# Patient Record
Sex: FEMALE | Race: WHITE | NOT HISPANIC OR LATINO | Employment: FULL TIME | URBAN - METROPOLITAN AREA
[De-identification: names, ages, dates, MRNs, and addresses within clinical notes are randomized per-mention and may not be internally consistent; named-entity substitution may affect disease eponyms.]

---

## 2017-01-05 ENCOUNTER — EMPLOYEE HEALTH (OUTPATIENT)
Dept: OCCUPATIONAL MEDICINE | Facility: CLINIC | Age: 22
End: 2017-01-05

## 2017-01-05 VITALS
OXYGEN SATURATION: 97 % | WEIGHT: 143 LBS | HEIGHT: 69 IN | HEART RATE: 87 BPM | DIASTOLIC BLOOD PRESSURE: 62 MMHG | BODY MASS INDEX: 21.18 KG/M2 | SYSTOLIC BLOOD PRESSURE: 116 MMHG | TEMPERATURE: 97.9 F | RESPIRATION RATE: 16 BRPM

## 2017-01-05 DIAGNOSIS — Z02.1 PRE-EMPLOYMENT DRUG SCREENING: ICD-10-CM

## 2017-01-05 DIAGNOSIS — Z02.1 PHYSICAL EXAM, PRE-EMPLOYMENT: ICD-10-CM

## 2017-01-05 LAB
AMP AMPHETAMINE: NORMAL
BAR BARBITURATES: NORMAL
BZO BENZODIAZEPINES: NORMAL
COC COCAINE: NORMAL
INT CON NEG: NORMAL
INT CON POS: NORMAL
MDMA ECSTASY: NORMAL
MET METHAMPHETAMINES: NORMAL
MTD METHADONE: NORMAL
OPI OPIATES: NORMAL
OXY OXYCODONE: NORMAL
PCP PHENCYCLIDINE: NORMAL
POC URINE DRUG SCREEN OCDRS: NEGATIVE
THC: NORMAL

## 2017-01-05 PROCEDURE — 80305 DRUG TEST PRSMV DIR OPT OBS: CPT | Performed by: PREVENTIVE MEDICINE

## 2017-01-05 PROCEDURE — 94375 RESPIRATORY FLOW VOLUME LOOP: CPT | Performed by: PREVENTIVE MEDICINE

## 2017-01-05 PROCEDURE — 94010 BREATHING CAPACITY TEST: CPT | Performed by: PREVENTIVE MEDICINE

## 2017-01-05 PROCEDURE — 99204 OFFICE O/P NEW MOD 45 MIN: CPT | Performed by: PREVENTIVE MEDICINE

## 2017-01-05 ASSESSMENT — VISUAL ACUITY
OD_CC: 20/15
OS_CC: 20/20

## 2017-01-05 NOTE — MR AVS SNAPSHOT
Anne John   2017 2:50 PM   Employee Health   MRN: [id number]    Department:  [facility]   Dept Phone:  [phone]    Description:  Female : 1995   Provider:  Enio Moreno D.O.           Allergies as of 2017     Allergen Noted Reactions    Azithromycin 2012   Swelling    Uterine contractions; rare reaction to azith and allergy shots, per Dr. Tang, allergist  [id number]    Other Environmental 2012         You were diagnosed with     Physical exam, pre-employment   [[id number]]         Vital Signs     Smoking Status                   Never Smoker            Basic Information     Date Of Birth Sex Race Ethnicity Preferred Language    1995 Female White Non- English      Problem List              ICD-10-CM Priority Class Noted - Resolved    Dermoid cyst of right ovary D27.0   2015 - Present    Preventative health care Z00.00   2015 - Present    Dysuria R30.0   2015 - Present    Bilateral ovarian cysts N83.201, N83.202   2015 - Present    Uses birth control Z30.9   2016 - Present    Mild intermittent asthma without complication J45.20   2016 - Present    Acne vulgaris L70.0   2016 - Present      Health Maintenance        Date Due Completion Dates    IMM HPV VACCINE (1 of 3 - Female 3 Dose Series) 2007 ---    IMM MENINGOCOCCAL VACCINE (MCV4) (2 of 2) 2007    IMM PNEUMOCOCCAL 19-64 (ADULT) MEDIUM RISK SERIES (1 of 1 - PPSV23) 2015 ---    IMM DTaP/Tdap/Td Vaccine (7 - Td) 2017, 2001, 1997, 1996, 1996, 1996            Current Immunizations     Dtap Vaccine 2001, 1997, 1996, 1996, 1996    Hepatitis A Vaccine, Ped/Adol 2007, 2004    Hepatitis B Vaccine Non-Recombivax (Ped/Adol) 1997, 1996, 1996    Hib Vaccine (Prp-d) Historical Data 1997, 1996, 1996, 1996    Influenza Vaccine Quad  Inj (Preserved) 11/3/2016, 11/25/2015    MMR Vaccine 5/29/2001, 6/3/1997    Meningococcal Conjugate Vaccine MCV4 (Menactra) 11/30/2007    OPV - Historical Data 5/29/2001, 1996, 1996, 1996    QF GOLD 11/25/2015    QUANTIFERON GOLD 11/25/2015    Tdap Vaccine 11/30/2007    Tuberculin Skin Test 11/10/2016 10:53 AM, 11/3/2016  1:03 PM, 9/20/2013  2:11 PM, 9/13/2013  2:15 PM    Varicella Vaccine Live 11/30/2007, 12/17/2004      Below and/or attached are the medications your provider expects you to take. Review all of your home medications and newly ordered medications with your provider and/or pharmacist. Follow medication instructions as directed by your provider and/or pharmacist. Please keep your medication list with you and share with your provider. Update the information when medications are discontinued, doses are changed, or new medications (including over-the-counter products) are added; and carry medication information at all times in the event of emergency situations     Allergies:  AZITHROMYCIN - Swelling     OTHER ENVIRONMENTAL - (reactions not documented)               Medications  Valid as of: January 05, 2017 -  4:46 PM    Generic Name Brand Name Tablet Size Instructions for use    Albuterol Sulfate (Aero Soln) albuterol 108 (90 BASE) MCG/ACT Inhale 2 Puffs by mouth every 6 hours as needed for Shortness of Breath.        Levonorgestrel-Ethinyl Estrad (Tab) NORDETTE 0.15-30 MG-MCG Take 1 Tab by mouth every day.        Montelukast Sodium (Tab) SINGULAIR 10 MG Take 1 Tab by mouth every day.        .                 Medicines prescribed today were sent to:     \[facility]\"" [facility] #[id number] - [place], [place] - [address] [facility]    [address] [place] [address]    Phone: [phone] Fax: [phone]    Open 24 Hours?: No      Medication refill instructions:       If your prescription bottle indicates you have medication refills left, it is not necessary to call your provider’s office.  Please contact your pharmacy and they will refill your medication.    If your prescription bottle indicates you do not have any refills left, you may request refills at any time through one of the following ways: The online [employer] system (except Urgent Care), by calling your provider’s office, or by asking your pharmacy to contact your provider’s office with a refill request. Medication refills are processed only during regular business hours and may not be available until the next business day. Your provider may request additional information or to have a follow-up visit with you prior to refilling your medication.   *Please Note: Medication refills are assigned a new Rx number when refilled electronically. Your pharmacy may indicate that no refills were authorized even though a new prescription for the same medication is available at the pharmacy. Please request the medicine by name with the pharmacy before contacting your provider for a refill.           C[employer] Access Code: Activation code not generated  Current [employer] Status: Active

## 2017-01-11 ENCOUNTER — NON-PROVIDER VISIT (OUTPATIENT)
Dept: MEDICAL GROUP | Facility: MEDICAL CENTER | Age: 22
End: 2017-01-11
Payer: COMMERCIAL

## 2017-01-11 DIAGNOSIS — Z23 NEED FOR HPV VACCINATION: ICD-10-CM

## 2017-01-11 DIAGNOSIS — Z23 NEED FOR TDAP VACCINATION: ICD-10-CM

## 2017-01-11 PROCEDURE — 90472 IMMUNIZATION ADMIN EACH ADD: CPT | Performed by: NURSE PRACTITIONER

## 2017-01-11 PROCEDURE — 90471 IMMUNIZATION ADMIN: CPT | Performed by: NURSE PRACTITIONER

## 2017-01-11 PROCEDURE — 90715 TDAP VACCINE 7 YRS/> IM: CPT | Performed by: NURSE PRACTITIONER

## 2017-01-11 PROCEDURE — 90651 9VHPV VACCINE 2/3 DOSE IM: CPT | Performed by: NURSE PRACTITIONER

## 2017-01-11 NOTE — PROGRESS NOTES
".Anne John is a 21 y.o. female here for a non-provider visit for:   TDAP    Reason for immunization: continue or complete series started at the office  Immunization records indicate need for vaccine: Yes  Minimum interval has been met for this vaccine: Yes  ABN completed: Not Indicated    VIS Dated  02/24/2015 was given to patient Yes  All IAC Questionnaire questions were answered “No.\"    Patient tolerated injection and no adverse effects were observed or reported YES.    Pt scheduled for next dose in series: No    .Anne John is a 21 y.o. female here for a non-provider visit for:   HPV 1 of 3    Reason for immunization: continue or complete series started at the office  Immunization records indicate need for vaccine: Yes  Minimum interval has been met for this vaccine: Yes  ABN completed: Not Indicated    VIS Dated  03/31/16 was given to patient Yes  All IAC Questionnaire questions were answered “No.\"    Patient tolerated injection and no adverse effects were observed or reported YES    Pt scheduled for next dose in series: Yes              "

## 2017-01-11 NOTE — MR AVS SNAPSHOT
Anne John   2017 1:45 PM   Non-Provider Visit   MRN: [id number]    Department:  [facility]   Dept Phone:  [phone]    Description:  Female : 1995   Provider:  MAHESH MESSINA MA           Allergies as of 2017     Allergen Noted Reactions    Azithromycin 2012   Swelling    Uterine contractions; rare reaction to azith and allergy shots, per Dr. Tang, allergist  [id number]    Other Environmental 2012         You were diagnosed with     Need for Tdap vaccination   [[id number]]       Need for HPV vaccination   [[id number]]         Vital Signs     Smoking Status                   Never Smoker            Basic Information     Date Of Birth Sex Race Ethnicity Preferred Language    1995 Female White Non- English      Problem List              ICD-10-CM Priority Class Noted - Resolved    Dermoid cyst of right ovary D27.0   2015 - Present    Preventative health care Z00.00   2015 - Present    Dysuria R30.0   2015 - Present    Bilateral ovarian cysts N83.201, N83.202   2015 - Present    Uses birth control Z30.9   2016 - Present    Mild intermittent asthma without complication J45.20   2016 - Present    Acne vulgaris L70.0   2016 - Present      Health Maintenance        Date Due Completion Dates    IMM HPV VACCINE (1 of 3 - Female 3 Dose Series) 2007 ---    IMM MENINGOCOCCAL VACCINE (MCV4) (2 of 2) 2007    IMM PNEUMOCOCCAL 19-64 (ADULT) MEDIUM RISK SERIES (1 of 1 - PPSV23) 2015 ---    IMM DTaP/Tdap/Td Vaccine (7 - Td) 2017, 2001, 1997, 1996, 1996, 1996            Current Immunizations     Dtap Vaccine 2001, 1997, 1996, 1996, 1996    HPV 9-VALENT VACCINE (GARDASIL 9)  Incomplete    Hepatitis A Vaccine, Ped/Adol 2007, 2004    Hepatitis B Vaccine Non-Recombivax (Ped/Adol) 1997, 1996, 1996    Hib Vaccine  (Prp-d) Historical Data 11/14/1997, 1996, 1996, 1996    Influenza Vaccine Quad Inj (Preserved) 11/3/2016, 11/25/2015    MMR Vaccine 5/29/2001, 6/3/1997    Meningococcal Conjugate Vaccine MCV4 (Menactra) 11/30/2007    OPV - Historical Data 5/29/2001, 1996, 1996, 1996    QF GOLD 11/25/2015    QUANTIFERON GOLD 11/25/2015    Tdap Vaccine  Incomplete, 11/30/2007    Tuberculin Skin Test 11/10/2016 10:53 AM, 11/3/2016  1:03 PM, 9/20/2013  2:11 PM, 9/13/2013  2:15 PM    Varicella Vaccine Live 11/30/2007, 12/17/2004      Below and/or attached are the medications your provider expects you to take. Review all of your home medications and newly ordered medications with your provider and/or pharmacist. Follow medication instructions as directed by your provider and/or pharmacist. Please keep your medication list with you and share with your provider. Update the information when medications are discontinued, doses are changed, or new medications (including over-the-counter products) are added; and carry medication information at all times in the event of emergency situations     Allergies:  AZITHROMYCIN - Swelling     OTHER ENVIRONMENTAL - (reactions not documented)               Medications  Valid as of: January 11, 2017 -  1:56 PM    Generic Name Brand Name Tablet Size Instructions for use    Albuterol Sulfate (Aero Soln) albuterol 108 (90 BASE) MCG/ACT Inhale 2 Puffs by mouth every 6 hours as needed for Shortness of Breath.        Levonorgestrel-Ethinyl Estrad (Tab) NORDETTE 0.15-30 MG-MCG Take 1 Tab by mouth every day.        Montelukast Sodium (Tab) SINGULAIR 10 MG Take 1 Tab by mouth every day.        .                 Medicines prescribed today were sent to:     [facility] #[id number] - [place], [place] - [address]    [address] [facility] [place] [address]    Phone: [phone] Fax: [phone]    Open 24 Hours?: No      Medication refill instructions:       If your prescription  bottle indicates you have medication refills left, it is not necessary to call your provider’s office. Please contact your pharmacy and they will refill your medication.    If your prescription bottle indicates you do not have any refills left, you may request refills at any time through one of the following ways: The online [employer] system (except Urgent Care), by calling your provider’s office, or by asking your pharmacy to contact your provider’s office with a refill request. Medication refills are processed only during regular business hours and may not be available until the next business day. Your provider may request additional information or to have a follow-up visit with you prior to refilling your medication.   *Please Note: Medication refills are assigned a new Rx number when refilled electronically. Your pharmacy may indicate that no refills were authorized even though a new prescription for the same medication is available at the pharmacy. Please request the medicine by name with the pharmacy before contacting your provider for a refill.           [employer] Access Code: Activation code not generated  Current [employer] Status: Active

## 2017-03-15 ENCOUNTER — APPOINTMENT (OUTPATIENT)
Dept: MEDICAL GROUP | Facility: MEDICAL CENTER | Age: 22
End: 2017-03-15
Payer: COMMERCIAL

## 2017-03-16 ENCOUNTER — NON-PROVIDER VISIT (OUTPATIENT)
Dept: MEDICAL GROUP | Facility: MEDICAL CENTER | Age: 22
End: 2017-03-16
Payer: COMMERCIAL

## 2017-03-16 DIAGNOSIS — Z23 NEED FOR HPV VACCINATION: ICD-10-CM

## 2017-03-16 PROCEDURE — 90471 IMMUNIZATION ADMIN: CPT | Performed by: NURSE PRACTITIONER

## 2017-03-16 PROCEDURE — 99999 PR NO CHARGE: CPT | Performed by: NURSE PRACTITIONER

## 2017-03-16 PROCEDURE — 90651 9VHPV VACCINE 2/3 DOSE IM: CPT | Performed by: NURSE PRACTITIONER

## 2017-03-16 NOTE — PROGRESS NOTES
"Anne John is a 21 y.o. female here for a non-provider visit for:   HPV 2 of 3    Reason for immunization: continue or complete series started at the office  Immunization records indicate need for vaccine: Yes  Minimum interval has been met for this vaccine: Yes  ABN completed: Yes    VIS Dated  4/15/15 was given to patient Yes  All IAC Questionnaire questions were answered “No.\"    Patient tolerated injection and no adverse effects were observed or reported yes.    Pt scheduled for next dose in series: No        "

## 2017-03-16 NOTE — MR AVS SNAPSHOT
Anne John   3/16/2017 2:00 PM   Non-Provider Visit   MRN: [id number]    Department:  [facility]   Dept Phone:  [phone]    Description:  Female : 1995   Provider:  MAHESH FIERRO MA           Allergies as of 3/16/2017     Allergen Noted Reactions    Azithromycin 2012   Swelling    Uterine contractions; rare reaction to azith and allergy shots, per Dr. Tang, allergist  [id number]    Other Environmental 2012         You were diagnosed with     Need for HPV vaccination   [[id number]]         Vital Signs     Smoking Status                   Never Smoker            Basic Information     Date Of Birth Sex Race Ethnicity Preferred Language    1995 Female White Non- English      Your appointments     Mar 16, 2017  2:00 PM   Non Provider 1 with MAHESH FIERRO MA   [facility])    [address]  [place] [address] [phone]           You will be receiving a confirmation call a few days before your appointment from our automated call confirmation system.            Sep 18, 2017  2:30 PM   Non Provider 1 with MAHESH FIERRO MA   [facility])    [address]  [place] [id number] [phone]           You will be receiving a confirmation call a few days before your appointment from our automated call confirmation system.              Problem List              ICD-10-CM Priority Class Noted - Resolved    Dermoid cyst of right ovary D27.0   2015 - Present    Preventative health care Z00.00   2015 - Present    Dysuria R30.0   2015 - Present    Bilateral ovarian cysts N83.201, N83.202   2015 - Present    Uses birth control Z30.9   2016 - Present    Mild intermittent asthma without complication J45.20   2016 - Present    Acne vulgaris L70.0   2016 - Present      Health Maintenance        Date Due Completion Dates    IMM  MENINGOCOCCAL VACCINE (MCV4) (2 of 2) 5/11/2012 11/30/2007    IMM PNEUMOCOCCAL 19-64 (ADULT) MEDIUM RISK SERIES (1 of 1 - PPSV23) 5/11/2015 ---    IMM HPV VACCINE (2 of 3 - Female 3 Dose Series) 3/8/2017 1/11/2017    IMM DTaP/Tdap/Td Vaccine (8 - Td) 1/11/2027 1/11/2017, 11/30/2007, 5/29/2001, 11/14/1997, 1996, 1996, 1996            Current Immunizations     Dtap Vaccine 5/29/2001, 11/14/1997, 1996, 1996, 1996    HPV 9-VALENT VACCINE (GARDASIL 9)  Incomplete, 1/11/2017    Hepatitis A Vaccine, Ped/Adol 11/30/2007, 12/17/2004    Hepatitis B Vaccine Non-Recombivax (Ped/Adol) 2/20/1997, 1996, 1996    Hib Vaccine (Prp-d) Historical Data 11/14/1997, 1996, 1996, 1996    Influenza Vaccine Quad Inj (Preserved) 11/3/2016, 11/25/2015    MMR Vaccine 5/29/2001, 6/3/1997    Meningococcal Conjugate Vaccine MCV4 (Menactra) 11/30/2007    OPV - Historical Data 5/29/2001, 1996, 1996, 1996    QF GOLD 11/25/2015    QUANTIFERON GOLD 11/25/2015    Tdap Vaccine 1/11/2017, 11/30/2007    Tuberculin Skin Test 11/10/2016 10:53 AM, 11/3/2016  1:03 PM, 9/20/2013  2:11 PM, 9/13/2013  2:15 PM    Varicella Vaccine Live 11/30/2007, 12/17/2004      Below and/or attached are the medications your provider expects you to take. Review all of your home medications and newly ordered medications with your provider and/or pharmacist. Follow medication instructions as directed by your provider and/or pharmacist. Please keep your medication list with you and share with your provider. Update the information when medications are discontinued, doses are changed, or new medications (including over-the-counter products) are added; and carry medication information at all times in the event of emergency situations     Allergies:  AZITHROMYCIN - Swelling     OTHER ENVIRONMENTAL - (reactions not documented)               Medications  Valid as of: March 16, 2017 -  1:58 PM    Generic Name Brand  Name Tablet Size Instructions for use    Albuterol Sulfate (Aero Soln) albuterol 108 (90 BASE) MCG/ACT Inhale 2 Puffs by mouth every 6 hours as needed for Shortness of Breath.        Levonorgestrel-Ethinyl Estrad (Tab) NORDETTE 0.15-30 MG-MCG Take 1 Tab by mouth every day.        Montelukast Sodium (Tab) SINGULAIR 10 MG Take 1 Tab by mouth every day.        .                 Medicines prescribed today were sent to:     [facility] #[id number] - [place], [place] - [address]    [address] [place] [address]    Phone: [phone] Fax: [phone]    Open 24 Hours?: No      Medication refill instructions:       If your prescription bottle indicates you have medication refills left, it is not necessary to call your provider’s office. Please contact your pharmacy and they will refill your medication.    If your prescription bottle indicates you do not have any refills left, you may request refills at any time through one of the following ways: The online [employer] system (except Urgent Care), by calling your provider’s office, or by asking your pharmacy to contact your provider’s office with a refill request. Medication refills are processed only during regular business hours and may not be available until the next business day. Your provider may request additional information or to have a follow-up visit with you prior to refilling your medication.   *Please Note: Medication refills are assigned a new Rx number when refilled electronically. Your pharmacy may indicate that no refills were authorized even though a new prescription for the same medication is available at the pharmacy. Please request the medicine by name with the pharmacy before contacting your provider for a refill.           [employer] Access Code: Activation code not generated  Current [employer] Status: Active

## 2017-08-11 ENCOUNTER — HOSPITAL ENCOUNTER (OUTPATIENT)
Dept: LAB | Facility: MEDICAL CENTER | Age: 22
End: 2017-08-11
Attending: PHYSICIAN ASSISTANT
Payer: COMMERCIAL

## 2017-08-11 LAB — CYTOLOGY REG CYTOL: NORMAL

## 2017-08-11 PROCEDURE — 87491 CHLMYD TRACH DNA AMP PROBE: CPT

## 2017-08-11 PROCEDURE — 88175 CYTOPATH C/V AUTO FLUID REDO: CPT

## 2017-08-11 PROCEDURE — 87591 N.GONORRHOEAE DNA AMP PROB: CPT

## 2017-08-12 LAB
C TRACH DNA SPEC QL NAA+PROBE: NEGATIVE
N GONORRHOEA DNA SPEC QL NAA+PROBE: NEGATIVE
SPECIMEN SOURCE: NORMAL

## 2017-08-29 ENCOUNTER — APPOINTMENT (OUTPATIENT)
Dept: RADIOLOGY | Facility: IMAGING CENTER | Age: 22
End: 2017-08-29
Attending: PHYSICIAN ASSISTANT
Payer: COMMERCIAL

## 2017-08-29 ENCOUNTER — OFFICE VISIT (OUTPATIENT)
Dept: URGENT CARE | Facility: CLINIC | Age: 22
End: 2017-08-29
Payer: COMMERCIAL

## 2017-08-29 VITALS
SYSTOLIC BLOOD PRESSURE: 92 MMHG | RESPIRATION RATE: 12 BRPM | HEART RATE: 81 BPM | BODY MASS INDEX: 22.22 KG/M2 | DIASTOLIC BLOOD PRESSURE: 66 MMHG | WEIGHT: 150 LBS | OXYGEN SATURATION: 98 % | HEIGHT: 69 IN | TEMPERATURE: 97.5 F

## 2017-08-29 DIAGNOSIS — M25.531 WRIST PAIN, RIGHT: ICD-10-CM

## 2017-08-29 DIAGNOSIS — M79.644 THUMB PAIN, RIGHT: ICD-10-CM

## 2017-08-29 DIAGNOSIS — Z87.81 HISTORY OF WRIST FRACTURE: ICD-10-CM

## 2017-08-29 PROCEDURE — 73110 X-RAY EXAM OF WRIST: CPT | Mod: TC,RT | Performed by: PHYSICIAN ASSISTANT

## 2017-08-29 PROCEDURE — 99214 OFFICE O/P EST MOD 30 MIN: CPT | Performed by: PHYSICIAN ASSISTANT

## 2017-08-29 PROCEDURE — 73130 X-RAY EXAM OF HAND: CPT | Mod: TC,RT | Performed by: PHYSICIAN ASSISTANT

## 2017-08-29 ASSESSMENT — ENCOUNTER SYMPTOMS
SORE THROAT: 0
VOMITING: 0
EYE REDNESS: 0
SWOLLEN GLANDS: 0
EYE DISCHARGE: 0
COUGH: 0
JOINT SWELLING: 1
CHANGE IN BOWEL HABIT: 0
FALLS: 0
MYALGIAS: 0
VISUAL CHANGE: 0
TINGLING: 1
NAUSEA: 0
FOCAL WEAKNESS: 0
FEVER: 0
SENSORY CHANGE: 1
NECK PAIN: 0
BACK PAIN: 0

## 2017-08-29 NOTE — PROGRESS NOTES
"Subjective:      Anne John is a 22 y.o. female who presents with Hand Injury (right hand)          Pt is 23 y/o female who presents with right wrist and thumb pain after getting kicked in the hand at soccer last night. She is concerned as she had prior wrist fracture needing repair with hardware and is worried about failure of the hardware.   Hand Injury   This is a new problem. The current episode started yesterday. The problem occurs constantly. The problem has been gradually worsening. Associated symptoms include joint swelling. Pertinent negatives include no change in bowel habit, chest pain, congestion, coughing, fever, myalgias, nausea, neck pain, rash, sore throat, swollen glands, visual change or vomiting. Exacerbated by: pressure or use of the thumb. She has tried NSAIDs and ice for the symptoms. The treatment provided moderate relief.       Review of Systems   Constitutional: Negative for fever and malaise/fatigue.   HENT: Negative for congestion and sore throat.    Eyes: Negative for discharge and redness.   Respiratory: Negative for cough.    Cardiovascular: Negative for chest pain.   Gastrointestinal: Negative for change in bowel habit, nausea and vomiting.   Musculoskeletal: Positive for joint pain and joint swelling. Negative for back pain, falls, myalgias and neck pain.   Skin: Negative for itching and rash.   Neurological: Positive for tingling and sensory change. Negative for focal weakness.          Objective:     BP (!) 92/66   Pulse 81   Temp 36.4 °C (97.5 °F)   Resp 12   Ht 1.753 m (5' 9\")   Wt 68 kg (150 lb)   SpO2 98%   BMI 22.15 kg/m²    PMH:  has a past medical history of Allergy; ASTHMA; and Cold.  MEDS:   Current Outpatient Prescriptions:   •  albuterol 108 (90 BASE) MCG/ACT Aero Soln inhalation aerosol, Inhale 2 Puffs by mouth every 6 hours as needed for Shortness of Breath., Disp: 8.5 g, Rfl: 6  •  montelukast (SINGULAIR) 10 MG Tab, Take 1 Tab by mouth every day., " Disp: 30 Tab, Rfl: 11  •  levonorgestrel-ethinyl estradiol (NORDETTE) 0.15-30 MG-MCG per tablet, Take 1 Tab by mouth every day., Disp: , Rfl:   ALLERGIES:   Allergies   Allergen Reactions   • Azithromycin Swelling     Uterine contractions; rare reaction to azith and allergy shots, per Dr. Tang, allergist  [id number]   • Other Environmental      SURGHX:   Past Surgical History:   Procedure Laterality Date   • PELVISCOPY ROBOTIC  12/22/2015    Procedure: PELVISCOPY ROBOTIC WITH OVARIAN CYSTECTOMY;  Surgeon: Pino Mcdonnell M.D.;  Location: SURGERY [facility];  Service:    • RADIUS ULNA ORIF  6/25/2012    Performed by KEITH SOUZA at SURGERY [facility]   • OVARIAN CYSTECTOMY       SOCHX:  reports that she has never smoked. She has never used smokeless tobacco. She reports that she does not drink alcohol or use drugs.  FH: Family history was reviewed, no pertinent findings to report    Physical Exam   Constitutional: She is oriented to person, place, and time. She appears well-developed and well-nourished.   HENT:   Head: Normocephalic and atraumatic.   Eyes: EOM are normal. Pupils are equal, round, and reactive to light.   Neck: Normal range of motion. Neck supple.   Cardiovascular: Normal rate.    Pulmonary/Chest: Effort normal. No respiratory distress.   Musculoskeletal:        Right wrist: She exhibits decreased range of motion, tenderness, bony tenderness and swelling. She exhibits no crepitus and no deformity.   Right wrist/thumb- noted tenderness along the base of the thumb and minimal tenderness over the scaphoid.   Decreased sensation over the dorsum of the thumb- increased pain with abduction.   Without laxity noted in the thumb. Noted palmar aspect of wrist- hardware noted- without visualized deformity.    Neurological: She is alert and oriented to person, place, and time. Coordination normal.   Skin: Skin is warm. Capillary refill takes less than 2 seconds. No rash noted.   Psychiatric: She  has a normal mood and affect. Her behavior is normal. Judgment and thought content normal.   Vitals reviewed.         Xr wrist:   No acute fracture or dislocation. The carpal rows appear intact.    Postsurgical fixation of the distal radius. No evidence of hardware complication.    No joint arthropathy.    Xr hand:  o acute fracture or dislocation.    No joint arthropathy.    I have reviewed the xrays myself and with the patient- agree with radiologist read.     Assessment/Plan:     1. Thumb pain, right  - DX-HAND 3+ RIGHT; Future  - DX-WRIST-COMPLETE 3+ RIGHT; Future    2. Wrist pain, right  - DX-HAND 3+ RIGHT; Future  - DX-WRIST-COMPLETE 3+ RIGHT; Future    3. History of wrist fracture    Due to tenderness along the base of the thumb along with minimal scaphoid tenderness today- pt. Was placed in thumb spica. Pt. Is a patient of MARINE- encouraged her to have recheck if symptoms continue by the end of the week as she may need to have further imaging.   Pt. Is to [place], and avoid sports at this time.   Follow up with [facility].   Patient given precautionary s/sx that mandate immediate follow up and evaluation in the ED. Advised of risks of not doing so.    DDX, Supportive care, and indications for immediate follow-up discussed with patient.    Instructed to return to clinic or nearest emergency department if we are not available for any change in condition, further concerns, or worsening of symptoms.    The patient demonstrated a good understanding and agreed with the treatment plan.

## 2017-09-18 ENCOUNTER — NON-PROVIDER VISIT (OUTPATIENT)
Dept: MEDICAL GROUP | Facility: MEDICAL CENTER | Age: 22
End: 2017-09-18
Payer: COMMERCIAL

## 2017-09-18 DIAGNOSIS — Z23 NEED FOR VACCINATION: ICD-10-CM

## 2017-09-18 PROCEDURE — 90651 9VHPV VACCINE 2/3 DOSE IM: CPT | Performed by: NURSE PRACTITIONER

## 2017-09-18 PROCEDURE — 90686 IIV4 VACC NO PRSV 0.5 ML IM: CPT | Performed by: NURSE PRACTITIONER

## 2017-09-18 PROCEDURE — 90472 IMMUNIZATION ADMIN EACH ADD: CPT | Performed by: NURSE PRACTITIONER

## 2017-09-18 PROCEDURE — 90471 IMMUNIZATION ADMIN: CPT | Performed by: NURSE PRACTITIONER

## 2017-09-18 NOTE — PROGRESS NOTES
"Anne John is a 22 y.o. female here for a non-provider visit for:   FLU  HPV 3 of 3    Reason for immunization: lack of immunity demonstrated on prior labs or testing  Immunization records indicate need for vaccine: Yes, confirmed with Epic  Minimum interval has been met for this vaccine: Yes  ABN completed: No    Order and dose verified by: Catalina Christensen  VIS Dated  4/15/15 and 8/7/15 was given to patient: Yes  All IAC Questionnaire questions were answered \"No.\"    Patient tolerated injection and no adverse effects were observed or reported: NO    Pt scheduled for next dose in series: No    "

## 2017-09-20 ENCOUNTER — OFFICE VISIT (OUTPATIENT)
Dept: MEDICAL GROUP | Facility: CLINIC | Age: 22
End: 2017-09-20
Payer: COMMERCIAL

## 2017-09-20 VITALS
DIASTOLIC BLOOD PRESSURE: 58 MMHG | WEIGHT: 147 LBS | OXYGEN SATURATION: 97 % | BODY MASS INDEX: 21.77 KG/M2 | SYSTOLIC BLOOD PRESSURE: 96 MMHG | HEART RATE: 68 BPM | TEMPERATURE: 99 F | HEIGHT: 69 IN

## 2017-09-20 DIAGNOSIS — T36.95XA ANTIBIOTIC-INDUCED YEAST INFECTION: ICD-10-CM

## 2017-09-20 DIAGNOSIS — J01.00 ACUTE NON-RECURRENT MAXILLARY SINUSITIS: ICD-10-CM

## 2017-09-20 DIAGNOSIS — B37.9 ANTIBIOTIC-INDUCED YEAST INFECTION: ICD-10-CM

## 2017-09-20 PROCEDURE — 99213 OFFICE O/P EST LOW 20 MIN: CPT | Performed by: NURSE PRACTITIONER

## 2017-09-20 RX ORDER — PREDNISONE 20 MG/1
20 TABLET ORAL DAILY
COMMUNITY
End: 2018-01-03

## 2017-09-20 RX ORDER — AMOXICILLIN 500 MG/1
500 CAPSULE ORAL 3 TIMES DAILY
Qty: 30 CAP | Refills: 0 | Status: SHIPPED | OUTPATIENT
Start: 2017-09-20 | End: 2017-09-30

## 2017-09-20 RX ORDER — FLUCONAZOLE 150 MG/1
150 TABLET ORAL DAILY
Qty: 1 TAB | Refills: 0 | Status: SHIPPED | OUTPATIENT
Start: 2017-09-20 | End: 2017-09-21

## 2017-09-20 ASSESSMENT — ENCOUNTER SYMPTOMS
SHORTNESS OF BREATH: 0
MYALGIAS: 0
COUGH: 1
CHILLS: 0
WHEEZING: 0
SORE THROAT: 1
SPUTUM PRODUCTION: 1
FEVER: 0

## 2017-09-20 NOTE — PROGRESS NOTES
Chief Complaint   Patient presents with   • Insect Bite       HISTORY OF PRESENT ILLNESS: Patient is a 22 y.o. female established patient who presents today due to 3 days hx of sore throat, coughing, sinus pressure with yellowish discharge. Pt reports that she was working with pt tested positive for strep last Friday but she does not feel she has it. Pt's centor score does not indicate swab either.     Pt is more concerned about her sinus pressure. As for her sore throat, she has self-treated with prednisone 20 mg daily x 2 dose so that she has been feeling better.     Centor Criteria  History of fever:  No   Tonsillar exudate:  No   Tender anterior cervical adenopathy:  No   Absence of cough:  No   Age under 15 add 1 point :  No   Age over 44 subtract 1 point:  No     Pt reports that she got yeast infection every time when she takes antibiotic. She request to have diflucan just in case.     Patient Active Problem List    Diagnosis Date Noted   • Uses birth control 11/04/2016   • Mild intermittent asthma without complication 11/04/2016   • Acne vulgaris 11/04/2016   • Bilateral ovarian cysts 12/22/2015   • Dysuria 11/05/2015   • Dermoid cyst of right ovary 06/24/2015   • Preventative health care 06/24/2015       Allergies:Azithromycin and Other environmental    Current Outpatient Prescriptions   Medication Sig Dispense Refill   • predniSONE (DELTASONE) 20 MG Tab Take 20 mg by mouth every day.     • albuterol 108 (90 BASE) MCG/ACT Aero Soln inhalation aerosol Inhale 2 Puffs by mouth every 6 hours as needed for Shortness of Breath. 8.5 g 6   • montelukast (SINGULAIR) 10 MG Tab Take 1 Tab by mouth every day. 30 Tab 11   • levonorgestrel-ethinyl estradiol (NORDETTE) 0.15-30 MG-MCG per tablet Take 1 Tab by mouth every day.       No current facility-administered medications for this visit.        Social History   Substance Use Topics   • Smoking status: Never Smoker   • Smokeless tobacco: Never Used   • Alcohol use No  "      Family Status   Relation Status   • Mother Alive   • Father Alive   • Sister Alive   • Brother Alive   • Sister Alive   •       Family History   Problem Relation Age of Onset   • Anesthesia Mother      PONV   • Osteoporosis Mother    • Osteoporosis Father    • Hypertension Father    • Cancer     • Hypertension     • Heart Disease           ROS:  Review of Systems   Constitutional: Negative for chills and fever.   HENT: Positive for congestion and sore throat.    Respiratory: Positive for cough and sputum production (a little bit). Negative for shortness of breath ( has asthma, using inhaler as needed. ) and wheezing.    Musculoskeletal: Negative for myalgias.        Objective:     Blood pressure (!) 96/58, pulse 68, temperature 37.2 °C (99 °F), height 1.753 m (5' 9\"), weight 66.7 kg (147 lb), last menstrual period 09/11/2017, SpO2 97 %.     Physical Exam:  Physical Exam   Constitutional: She is oriented to person, place, and time and well-developed, well-nourished, and in no distress.   HENT:   Head: Normocephalic and atraumatic.   Nose: Right sinus exhibits maxillary sinus tenderness. Right sinus exhibits no frontal sinus tenderness. Left sinus exhibits maxillary sinus tenderness. Left sinus exhibits no frontal sinus tenderness.   Mouth/Throat: No oropharyngeal exudate, posterior oropharyngeal edema, posterior oropharyngeal erythema or tonsillar abscesses.   Eyes: Conjunctivae are normal.   Neck: Neck supple. No JVD present.   Cardiovascular: Normal rate.    Pulmonary/Chest: Effort normal. No respiratory distress. She has no wheezes. She has no rales.   Musculoskeletal: Normal range of motion. She exhibits no edema.   Neurological: She is alert and oriented to person, place, and time.   Skin: Skin is warm. No erythema.   Vitals reviewed.        Assessment/Plan:  1. Acute non-recurrent maxillary sinusitis  - amoxicillin (AMOXIL) 500 MG Cap; Take 1 Cap by mouth 3 times a day for 10 days.  Dispense: 30 Cap; " Refill: 0  - fluconazole (DIFLUCAN) 150 MG tablet; Take 1 Tab by mouth every day for 1 dose.  Dispense: 1 Tab; Refill: 0    2. Antibiotic-induced yeast infection  - fluconazole (DIFLUCAN) 150 MG tablet; Take 1 Tab by mouth every day for 1 dose.  Dispense: 1 Tab; Refill: 0    Pt also requested referral to [facility] for her right wrist. She is instructed to request that from the doctor she saw last time for right wrist pain or PCP. No image evidence of fracture back to August and her pain has actually gone better. Not using brace anymore.     Differential diagnoses and indications for immediate follow-up discussed with patient.    Instructed to return to clinic or nearest emergency department for any change in condition, further concerns, or worsening of symptoms.    The patient demonstrated a good understanding and agreed with the treatment plan.

## 2017-09-22 ENCOUNTER — OFFICE VISIT (OUTPATIENT)
Dept: URGENT CARE | Facility: CLINIC | Age: 22
End: 2017-09-22
Payer: COMMERCIAL

## 2017-09-22 VITALS
WEIGHT: 147 LBS | HEART RATE: 97 BPM | HEIGHT: 69 IN | DIASTOLIC BLOOD PRESSURE: 60 MMHG | TEMPERATURE: 98.6 F | SYSTOLIC BLOOD PRESSURE: 102 MMHG | OXYGEN SATURATION: 98 % | RESPIRATION RATE: 16 BRPM | BODY MASS INDEX: 21.77 KG/M2

## 2017-09-22 DIAGNOSIS — B96.89 ACUTE BACTERIAL SINUSITIS: ICD-10-CM

## 2017-09-22 DIAGNOSIS — R05.9 COUGH: ICD-10-CM

## 2017-09-22 DIAGNOSIS — R06.02 SOB (SHORTNESS OF BREATH): ICD-10-CM

## 2017-09-22 DIAGNOSIS — J01.90 ACUTE BACTERIAL SINUSITIS: ICD-10-CM

## 2017-09-22 PROCEDURE — 99214 OFFICE O/P EST MOD 30 MIN: CPT | Performed by: NURSE PRACTITIONER

## 2017-09-22 RX ORDER — AMOXICILLIN AND CLAVULANATE POTASSIUM 875; 125 MG/1; MG/1
1 TABLET, FILM COATED ORAL 2 TIMES DAILY
Qty: 14 TAB | Refills: 0 | Status: SHIPPED | OUTPATIENT
Start: 2017-09-22 | End: 2017-09-29

## 2017-09-22 RX ORDER — CODEINE PHOSPHATE AND GUAIFENESIN 10; 100 MG/5ML; MG/5ML
5 SOLUTION ORAL EVERY 4 HOURS PRN
Qty: 250 ML | Refills: 0 | Status: SHIPPED | OUTPATIENT
Start: 2017-09-22 | End: 2018-01-03

## 2017-09-22 RX ORDER — BENZONATATE 100 MG/1
100 CAPSULE ORAL 3 TIMES DAILY PRN
Qty: 60 CAP | Refills: 0 | Status: SHIPPED | OUTPATIENT
Start: 2017-09-22 | End: 2018-01-03

## 2017-09-22 RX ORDER — PREDNISONE 20 MG/1
TABLET ORAL
Qty: 10 TAB | Refills: 0 | Status: SHIPPED | OUTPATIENT
Start: 2017-09-22 | End: 2018-01-03

## 2017-09-22 ASSESSMENT — ENCOUNTER SYMPTOMS
COUGH: 1
SPUTUM PRODUCTION: 1
FEVER: 0
RHINORRHEA: 1
SHORTNESS OF BREATH: 1
SORE THROAT: 1
HEADACHES: 1

## 2017-09-23 NOTE — PROGRESS NOTES
Subjective:      Anne John is a 22 y.o. female who presents with Cough (got worse since x 3 days)            Cough   This is a new problem. Episode onset: Pt reports she began with sinus congestion and sore throat one week ago. She then developed a cough and she feels mildly short of breath. The problem has been gradually worsening. The cough is productive of sputum. Associated symptoms include ear congestion, headaches, nasal congestion, postnasal drip, rhinorrhea, a sore throat and shortness of breath. Pertinent negatives include no fever. Associated symptoms comments: Pt reports that she saw her PCP 3 days ago and was given Amoxicillin. She states she is feeling worse. When she bends forward she complains of severe sinus pressure. She has tried rest, OTC cough suppressant and a beta-agonist inhaler (currently on ABX) for the symptoms. The treatment provided no relief. There is no history of asthma or pneumonia.       Review of Systems   Constitutional: Positive for malaise/fatigue. Negative for fever.   HENT: Positive for congestion, postnasal drip, rhinorrhea and sore throat.    Respiratory: Positive for cough, sputum production and shortness of breath.    Neurological: Positive for headaches.   All other systems reviewed and are negative.    Past Medical History:   Diagnosis Date   • Allergy    • ASTHMA     seasonal/exercise induced, uses inhaler   • Cold     amox, for a cold      Past Surgical History:   Procedure Laterality Date   • PELVISCOPY ROBOTIC  12/22/2015    Procedure: PELVISCOPY ROBOTIC WITH OVARIAN CYSTECTOMY;  Surgeon: Pino Mcdonnell M.D.;  Location: SURGERY [facility];  Service:    • RADIUS ULNA ORIF  6/25/2012    Performed by KEITH SOUZA at SURGERY [facility]   • OVARIAN CYSTECTOMY        Social History     Social History   • Marital status: Single     Spouse name: N/A   • Number of children: N/A   • Years of education: N/A     Occupational History   • Not on  "file.     Social History Main Topics   • Smoking status: Never Smoker   • Smokeless tobacco: Never Used   • Alcohol use No   • Drug use: No   • Sexual activity: Yes     Partners: Male     Birth control/ protection: Pill     Other Topics Concern   • Not on file     Social History Narrative   • No narrative on file          Objective:     /60   Pulse 97   Temp 37 °C (98.6 °F)   Resp 16   Ht 1.753 m (5' 9\")   Wt 66.7 kg (147 lb)   LMP 09/11/2017   SpO2 98%   Breastfeeding? No   BMI 21.71 kg/m²      Physical Exam   Constitutional: She is oriented to person, place, and time. Vital signs are normal. She appears well-developed and well-nourished.   HENT:   Head: Normocephalic and atraumatic.   Right Ear: Tympanic membrane and external ear normal.   Left Ear: Tympanic membrane and external ear normal.   Nose: Rhinorrhea and sinus tenderness present. Right sinus exhibits maxillary sinus tenderness and frontal sinus tenderness. Left sinus exhibits maxillary sinus tenderness and frontal sinus tenderness.   Mouth/Throat: Posterior oropharyngeal erythema present. No oropharyngeal exudate or posterior oropharyngeal edema.   Eyes: EOM are normal. Pupils are equal, round, and reactive to light.   Neck: Trachea normal, normal range of motion and phonation normal.   Cardiovascular: Normal rate and regular rhythm.    Pulmonary/Chest: Effort normal. She has no decreased breath sounds. She has no wheezes. She has rhonchi in the right upper field and the left upper field.   Musculoskeletal: Normal range of motion.   Lymphadenopathy:        Head (right side): Submandibular adenopathy present.        Head (left side): Submandibular adenopathy present.   Neurological: She is alert and oriented to person, place, and time.   Skin: Skin is warm and dry. Capillary refill takes less than 2 seconds.   Psychiatric: She has a normal mood and affect. Her speech is normal and behavior is normal. Thought content normal.   Vitals " reviewed.              Assessment/Plan:     1. Acute bacterial sinusitis  - amoxicillin-clavulanate (AUGMENTIN) 875-125 MG Tab; Take 1 Tab by mouth 2 times a day for 7 days.  Dispense: 14 Tab; Refill: 0    2. Cough  - predniSONE (DELTASONE) 20 MG Tab; Take 2 tabs PO daily for five days  Dispense: 10 Tab; Refill: 0  - guaifenesin-codeine (ROBITUSSIN AC) Solution oral solution; Take 5 mL by mouth every four hours as needed.  Dispense: 250 mL; Refill: 0  - benzonatate (TESSALON) 100 MG Cap; Take 1 Cap by mouth 3 times a day as needed for Cough.  Dispense: 60 Cap; Refill: 0    3. SOB (shortness of breath)  - predniSONE (DELTASONE) 20 MG Tab; Take 2 tabs PO daily for five days  Dispense: 10 Tab; Refill: 0    Advised her to discontinue Amox 500  Increase water intake  Daily Zyrtec  Flonase daily for one week  Warm salt water gargles  Sedating effects of cough syrup discussed  Supportive care, differential diagnoses, and indications for immediate follow-up discussed with patient.    Pathogenesis of diagnosis discussed including typical length and natural progression.      Instructed to return to  or nearest emergency department if symptoms fail to improve, for any change in condition, further concerns, or new concerning symptoms.  Patient states understanding of the plan of care and discharge instructions.

## 2017-11-06 ENCOUNTER — NON-PROVIDER VISIT (OUTPATIENT)
Dept: OCCUPATIONAL MEDICINE | Facility: CLINIC | Age: 22
End: 2017-11-06

## 2017-11-06 DIAGNOSIS — Z11.1 PPD SCREENING TEST: ICD-10-CM

## 2017-11-06 PROCEDURE — 86580 TB INTRADERMAL TEST: CPT | Performed by: PREVENTIVE MEDICINE

## 2017-11-08 ENCOUNTER — PATIENT MESSAGE (OUTPATIENT)
Dept: MEDICAL GROUP | Facility: MEDICAL CENTER | Age: 22
End: 2017-11-08

## 2017-11-08 ENCOUNTER — NON-PROVIDER VISIT (OUTPATIENT)
Dept: OCCUPATIONAL MEDICINE | Facility: CLINIC | Age: 22
End: 2017-11-08

## 2017-11-08 DIAGNOSIS — M25.531 CHRONIC PAIN OF RIGHT WRIST: ICD-10-CM

## 2017-11-08 DIAGNOSIS — Z11.1 ENCOUNTER FOR PPD SKIN TEST READING: ICD-10-CM

## 2017-11-08 DIAGNOSIS — Z98.890 S/P WRIST SURGERY: ICD-10-CM

## 2017-11-08 DIAGNOSIS — G89.29 CHRONIC PAIN OF RIGHT WRIST: ICD-10-CM

## 2017-11-09 LAB — TB WHEAL 3D P 5 TU DIAM: NORMAL MM

## 2018-01-03 DIAGNOSIS — Z01.812 PRE-OPERATIVE LABORATORY EXAMINATION: ICD-10-CM

## 2018-01-03 PROCEDURE — 36415 COLL VENOUS BLD VENIPUNCTURE: CPT

## 2018-01-03 PROCEDURE — 84703 CHORIONIC GONADOTROPIN ASSAY: CPT

## 2018-01-03 NOTE — OR NURSING
PreAdmit Appointment: Patient given Preparing for your procedure handout. Patient instructed to continue regularly prescribed medications through day before surgery. Instructed to take the following medications the day of surgery with a sip of water per Anesthesia protocol: Singulair, Albuterol if needed. Instructed to bring inhaler DOS.

## 2018-01-04 ENCOUNTER — HOSPITAL ENCOUNTER (OUTPATIENT)
Facility: MEDICAL CENTER | Age: 23
End: 2018-01-04
Attending: ORTHOPAEDIC SURGERY | Admitting: ORTHOPAEDIC SURGERY
Payer: COMMERCIAL

## 2018-01-04 VITALS
OXYGEN SATURATION: 97 % | DIASTOLIC BLOOD PRESSURE: 55 MMHG | WEIGHT: 143.52 LBS | RESPIRATION RATE: 16 BRPM | HEART RATE: 85 BPM | BODY MASS INDEX: 21.26 KG/M2 | HEIGHT: 69 IN | TEMPERATURE: 97.9 F | SYSTOLIC BLOOD PRESSURE: 104 MMHG

## 2018-01-04 DIAGNOSIS — M25.531 ACUTE PAIN OF RIGHT WRIST: ICD-10-CM

## 2018-01-04 LAB — HCG SERPL QL: NEGATIVE

## 2018-01-04 PROCEDURE — 700105 HCHG RX REV CODE 258: Performed by: ORTHOPAEDIC SURGERY

## 2018-01-04 PROCEDURE — 302135 SEQUENTIAL COMPRESSION MACHINE: Performed by: ORTHOPAEDIC SURGERY

## 2018-01-04 PROCEDURE — 160009 HCHG ANES TIME/MIN: Performed by: ORTHOPAEDIC SURGERY

## 2018-01-04 PROCEDURE — 700101 HCHG RX REV CODE 250

## 2018-01-04 PROCEDURE — 700111 HCHG RX REV CODE 636 W/ 250 OVERRIDE (IP)

## 2018-01-04 PROCEDURE — A9270 NON-COVERED ITEM OR SERVICE: HCPCS

## 2018-01-04 PROCEDURE — 160039 HCHG SURGERY MINUTES - EA ADDL 1 MIN LEVEL 3: Performed by: ORTHOPAEDIC SURGERY

## 2018-01-04 PROCEDURE — 700102 HCHG RX REV CODE 250 W/ 637 OVERRIDE(OP)

## 2018-01-04 PROCEDURE — 160002 HCHG RECOVERY MINUTES (STAT): Performed by: ORTHOPAEDIC SURGERY

## 2018-01-04 PROCEDURE — 501838 HCHG SUTURE GENERAL: Performed by: ORTHOPAEDIC SURGERY

## 2018-01-04 PROCEDURE — 160025 RECOVERY II MINUTES (STATS): Performed by: ORTHOPAEDIC SURGERY

## 2018-01-04 PROCEDURE — 160047 HCHG PACU  - EA ADDL 30 MINS PHASE II: Performed by: ORTHOPAEDIC SURGERY

## 2018-01-04 PROCEDURE — 160036 HCHG PACU - EA ADDL 30 MINS PHASE I: Performed by: ORTHOPAEDIC SURGERY

## 2018-01-04 PROCEDURE — 160035 HCHG PACU - 1ST 60 MINS PHASE I: Performed by: ORTHOPAEDIC SURGERY

## 2018-01-04 PROCEDURE — 160048 HCHG OR STATISTICAL LEVEL 1-5: Performed by: ORTHOPAEDIC SURGERY

## 2018-01-04 PROCEDURE — 160028 HCHG SURGERY MINUTES - 1ST 30 MINS LEVEL 3: Performed by: ORTHOPAEDIC SURGERY

## 2018-01-04 PROCEDURE — 500881 HCHG PACK, EXTREMITY: Performed by: ORTHOPAEDIC SURGERY

## 2018-01-04 PROCEDURE — 160046 HCHG PACU - 1ST 60 MINS PHASE II: Performed by: ORTHOPAEDIC SURGERY

## 2018-01-04 RX ORDER — HALOPERIDOL 5 MG/ML
INJECTION INTRAMUSCULAR
Status: COMPLETED
Start: 2018-01-04 | End: 2018-01-04

## 2018-01-04 RX ORDER — ACETAMINOPHEN 500 MG
TABLET ORAL
Status: COMPLETED
Start: 2018-01-04 | End: 2018-01-04

## 2018-01-04 RX ORDER — BACITRACIN ZINC 500 [USP'U]/G
OINTMENT TOPICAL
Status: DISCONTINUED | OUTPATIENT
Start: 2018-01-04 | End: 2018-01-04 | Stop reason: HOSPADM

## 2018-01-04 RX ORDER — BUPIVACAINE HYDROCHLORIDE AND EPINEPHRINE 5; 5 MG/ML; UG/ML
INJECTION, SOLUTION EPIDURAL; INTRACAUDAL; PERINEURAL
Status: DISCONTINUED | OUTPATIENT
Start: 2018-01-04 | End: 2018-01-04 | Stop reason: HOSPADM

## 2018-01-04 RX ORDER — OXYCODONE HCL 5 MG/5 ML
SOLUTION, ORAL ORAL
Status: COMPLETED
Start: 2018-01-04 | End: 2018-01-04

## 2018-01-04 RX ORDER — SODIUM CHLORIDE, SODIUM LACTATE, POTASSIUM CHLORIDE, CALCIUM CHLORIDE 600; 310; 30; 20 MG/100ML; MG/100ML; MG/100ML; MG/100ML
1000 INJECTION, SOLUTION INTRAVENOUS
Status: DISCONTINUED | OUTPATIENT
Start: 2018-01-04 | End: 2018-01-04 | Stop reason: HOSPADM

## 2018-01-04 RX ORDER — ONDANSETRON 4 MG/1
4 TABLET, FILM COATED ORAL EVERY 6 HOURS PRN
Qty: 20 TAB | Refills: 0 | Status: SHIPPED | OUTPATIENT
Start: 2018-01-04 | End: 2018-01-18

## 2018-01-04 RX ORDER — OXYCODONE HCL 10 MG/1
TABLET, FILM COATED, EXTENDED RELEASE ORAL
Status: COMPLETED
Start: 2018-01-04 | End: 2018-01-04

## 2018-01-04 RX ORDER — GABAPENTIN 300 MG/1
CAPSULE ORAL
Status: COMPLETED
Start: 2018-01-04 | End: 2018-01-04

## 2018-01-04 RX ORDER — HYDROCODONE BITARTRATE AND ACETAMINOPHEN 5; 325 MG/1; MG/1
1-2 TABLET ORAL EVERY 4 HOURS PRN
Qty: 45 TAB | Refills: 0 | Status: SHIPPED | OUTPATIENT
Start: 2018-01-04 | End: 2018-01-14

## 2018-01-04 RX ADMIN — FENTANYL CITRATE 25 MCG: 50 INJECTION, SOLUTION INTRAMUSCULAR; INTRAVENOUS at 10:30

## 2018-01-04 RX ADMIN — FENTANYL CITRATE 25 MCG: 50 INJECTION, SOLUTION INTRAMUSCULAR; INTRAVENOUS at 10:41

## 2018-01-04 RX ADMIN — FENTANYL CITRATE 25 MCG: 50 INJECTION, SOLUTION INTRAMUSCULAR; INTRAVENOUS at 10:50

## 2018-01-04 RX ADMIN — OXYCODONE HYDROCHLORIDE 5 MG: 5 SOLUTION ORAL at 09:50

## 2018-01-04 RX ADMIN — HALOPERIDOL LACTATE 1 MG: 5 INJECTION, SOLUTION INTRAMUSCULAR at 11:27

## 2018-01-04 RX ADMIN — SODIUM CHLORIDE, POTASSIUM CHLORIDE, SODIUM LACTATE AND CALCIUM CHLORIDE 1000 ML: 600; 310; 30; 20 INJECTION, SOLUTION INTRAVENOUS at 07:30

## 2018-01-04 RX ADMIN — FENTANYL CITRATE 50 MCG: 50 INJECTION, SOLUTION INTRAMUSCULAR; INTRAVENOUS at 11:27

## 2018-01-04 RX ADMIN — OXYCODONE HYDROCHLORIDE 10 MG: 10 TABLET, FILM COATED, EXTENDED RELEASE ORAL at 07:40

## 2018-01-04 RX ADMIN — FENTANYL CITRATE 25 MCG: 50 INJECTION, SOLUTION INTRAMUSCULAR; INTRAVENOUS at 10:17

## 2018-01-04 RX ADMIN — FENTANYL CITRATE 25 MCG: 50 INJECTION, SOLUTION INTRAMUSCULAR; INTRAVENOUS at 10:00

## 2018-01-04 RX ADMIN — ACETAMINOPHEN 1000 MG: 500 TABLET, COATED ORAL at 07:40

## 2018-01-04 RX ADMIN — GABAPENTIN 300 MG: 300 CAPSULE ORAL at 07:40

## 2018-01-04 RX ADMIN — FENTANYL CITRATE 25 MCG: 50 INJECTION, SOLUTION INTRAMUSCULAR; INTRAVENOUS at 10:08

## 2018-01-04 RX ADMIN — FENTANYL CITRATE 25 MCG: 50 INJECTION, SOLUTION INTRAMUSCULAR; INTRAVENOUS at 10:31

## 2018-01-04 ASSESSMENT — PAIN SCALES - GENERAL
PAINLEVEL_OUTOF10: 10
PAINLEVEL_OUTOF10: 7
PAINLEVEL_OUTOF10: 3
PAINLEVEL_OUTOF10: 5
PAINLEVEL_OUTOF10: 6
PAINLEVEL_OUTOF10: 0
PAINLEVEL_OUTOF10: 5

## 2018-01-04 NOTE — OR NURSING
0944  Received from or  resp spont r arm dressing c/d/i  Fingers warm  Good movement    Medicated for pain  1030 continuing to medicate for pain  Pain  Tolerable 1100  Dressing remains c/ /i  Fingers warm  Good movement  Pain tolerable  Meets discharge criteria

## 2018-01-04 NOTE — OR NURSING
1000: Rcvd report from JUANITA Walker and assumed care.  1015: Pain not tolerable, pain medication given per MAR, no nausea. Awake and alert.  1020: Report given back to JUANITA Walker.

## 2018-01-04 NOTE — DISCHARGE INSTRUCTIONS
ACTIVITY: Rest and take it easy for the first 24 hours.  A responsible adult is recommended to remain with you during that time.  It is normal to feel sleepy.  We encourage you to not do anything that requires balance, judgment or coordination.    MILD FLU-LIKE SYMPTOMS ARE NORMAL. YOU MAY EXPERIENCE GENERALIZED MUSCLE ACHES, THROAT IRRITATION, HEADACHE AND/OR SOME NAUSEA.    FOR 24 HOURS DO NOT:  Drive, operate machinery or run household appliances.  Drink beer or alcoholic beverages.   Make important decisions or sign legal documents.    SPECIAL INSTRUCTIONS: Ice and elevate  Keep splint intacct    DIET: To avoid nausea, slowly advance diet as tolerated, avoiding spicy or greasy foods for the first day.  Add more substantial food to your diet according to your physician's instructions.  Babies can be fed formula or breast milk as soon as they are hungry.  INCREASE FLUIDS AND FIBER TO AVOID CONSTIPATION.    SURGICAL DRESSING/BATHING: keep dressing clean dry and intact  Until followup appointment    FOLLOW-UP APPOINTMENT:  A follow-up appointment as scheduled    You should CALL YOUR PHYSICIAN if you develop:  Fever greater than 101 degrees F.  Pain not relieved by medication, or persistent nausea or vomiting.  Excessive bleeding (blood soaking through dressing) or unexpected drainage from the wound.  Extreme redness or swelling around the incision site, drainage of pus or foul smelling drainage.  Inability to urinate or empty your bladder within 8 hours.  Problems with breathing or chest pain.    You should call 911 if you develop problems with breathing or chest pain.  If you are unable to contact your doctor or surgical center, you should go to the nearest emergency room or urgent care center.  Physician's telephone #: [phone]    If any questions arise, call your doctor.  If your doctor is not available, please feel free to call the Surgical Center at [phone].  The Center is open Monday through Friday  from 7AM to 7PM.  You can also call the HEALTH HOTLINE open 24 hours/day, 7 days/week and speak to a nurse at [phone], or toll free at [phone].    A registered nurse may call you a few days after your surgery to see how you are doing after your procedure.    MEDICATIONS: Resume taking daily medication.  Take prescribed pain medication with food.  If no medication is prescribed, you may take non-aspirin pain medication if needed.  PAIN MEDICATION CAN BE VERY CONSTIPATING.  Take a stool softener or laxative such as senokot, pericolace, or milk of magnesia if needed.    Prescription given forNorco.  Last pain medication given at 1000    If your physician has prescribed pain medication that includes Acetaminophen (Tylenol), do not take additional Acetaminophen (Tylenol) while taking the prescribed medication.    Depression / Suicide Risk    As you are discharged from this [facility] facility, it is important to learn how to keep safe from harming yourself.    Recognize the warning signs:  · Abrupt changes in personality, positive or negative- including increase in energy   · Giving away possessions  · Change in eating patterns- significant weight changes-  positive or negative  · Change in sleeping patterns- unable to sleep or sleeping all the time   · Unwillingness or inability to communicate  · Depression  · Unusual sadness, discouragement and loneliness  · Talk of wanting to die  · Neglect of personal appearance   · Rebelliousness- reckless behavior  · Withdrawal from people/activities they love  · Confusion- inability to concentrate     If you or a loved one observes any of these behaviors or has concerns about self-harm, here's what you can do:  · Talk about it- your feelings and reasons for harming yourself  · Remove any means that you might use to hurt yourself (examples: pills, rope, extension cords, firearm)  · Get professional help from the community (Mental Health, Substance Abuse,  psychological counseling)  · Do not be alone:Call your Safe Contact- someone whom you trust who will be there for you.  · Call your local CRISIS HOTLINE 748-2376 or 715-827-2992  · Call your local Children's Mobile Crisis Response Team [place] (032) 438-7179 or www.Layer  · Call the toll free National Suicide Prevention Hotlines   · National Suicide Prevention Lifeline 446-860-WVKQ (1948)  · National Hope Line Network 800-SUICIDE (942-4745)

## 2018-01-04 NOTE — OP REPORT
DATE OF SERVICE:  01/04/2018    PREOPERATIVE DIAGNOSIS:  Right distal radius painful hardware.    POSTOPERATIVE DIAGNOSIS:  Right distal radius painful hardware.    PROCEDURE:  Removal of hardware, right distal radius.    SURGEON:  Reese Ramos MD    ANESTHESIA:  General.    ANESTHESIOLOGIST:  Geeta Alfonso MD    ANTIBIOTICS:  Ancef.    COMPLICATIONS:  None.    INDICATIONS:  The patient had an ORIF of right distal radius fracture greater   than a year ago.  She is having persistent pain likely related to the hardware   with some irritation.  She elects to have the hardware removed.  Risks of   surgery discussed at length.  All questions answered.  No guarantees given.    DESCRIPTION OF PROCEDURE:  The patient was properly identified in the   preoperative holding area.  The right wrist was marked as correct surgical   site.  She was taken back where she was placed supine on the operative table.    She underwent general anesthesia.  Right upper extremity was sterilely   prepped and draped in standard fashion.  Limb was exsanguinated.  Tourniquet   was insufflated to 200 mmHg.  Using the previous scar, a volar approach was   made.  Careful subcutaneous dissection was performed.  There was fairly   extensive scar tissue and the FCR was identified, carefully retracted and   blunt retractors were placed and careful dissection was performed, carrying it   down to the plate.  This was a TriMed plate.  There were 6 distal locking   screws.  These were all removed without difficulty in its entirety, no broken   hardware.  The 3 proximal screws were also removed in its entirety, no broken   hardware and then the plate was removed.  The bone was carefully debrided with   a key elevator and a small curette and this was irrigated and then the   tourniquet was deflated.  Good hemostasis obtained and then closed with 2-0   Vicryl and 3-0 nylon skin.  An additional 10 mL of 0.5% Marcaine with   epinephrine injected.  Soft  dressings were applied.  Patient was extubated and   transferred to recovery room in stable condition.       ____________________________________     MD YAIMA LAURENT / LEONARDA    DD:  01/04/2018 10:04:02  DT:  01/04/2018 10:15:54    D#:  [id number]  Job#:  [id number]

## 2018-01-04 NOTE — OR NURSING
1103Pt received in PACU2. Report received from RN. Pt dressed and transferred to recliner. VS taken per doc flow. Water and  warm blanket provided. ASHLYN chen cdi, cms intact, sling, elevated on pillow, ice pack. C/o pain and nausea. Given emesis bag, IV fluids, open, queeze ease, warm wash clothe. Medicated.   1300Pt discharged via wheelchair escorted by family and RN. Left with all personal possessions and discharge instructions. Pt and family verbalized understanding of instructions.  PIV removed. Even and non labored breathing. No signs of distress noted. Pain tolerable. Nausea resolved.

## 2018-01-16 ENCOUNTER — OFFICE VISIT (OUTPATIENT)
Dept: MEDICAL GROUP | Facility: MEDICAL CENTER | Age: 23
End: 2018-01-16
Payer: COMMERCIAL

## 2018-01-16 VITALS
HEART RATE: 84 BPM | DIASTOLIC BLOOD PRESSURE: 60 MMHG | WEIGHT: 139.6 LBS | TEMPERATURE: 98 F | HEIGHT: 69 IN | SYSTOLIC BLOOD PRESSURE: 102 MMHG | OXYGEN SATURATION: 99 % | BODY MASS INDEX: 20.68 KG/M2

## 2018-01-16 DIAGNOSIS — Z00.00 ANNUAL PHYSICAL EXAM: ICD-10-CM

## 2018-01-16 DIAGNOSIS — Z78.9 USES BIRTH CONTROL: ICD-10-CM

## 2018-01-16 DIAGNOSIS — Z98.890 S/P WRIST SURGERY: ICD-10-CM

## 2018-01-16 DIAGNOSIS — J45.20 MILD INTERMITTENT ASTHMA WITHOUT COMPLICATION: ICD-10-CM

## 2018-01-16 PROCEDURE — 99395 PREV VISIT EST AGE 18-39: CPT | Performed by: NURSE PRACTITIONER

## 2018-01-16 RX ORDER — ALBUTEROL SULFATE 90 UG/1
2 AEROSOL, METERED RESPIRATORY (INHALATION) EVERY 6 HOURS PRN
Qty: 8.5 G | Refills: 6 | Status: SHIPPED | OUTPATIENT
Start: 2018-01-16 | End: 2021-10-20 | Stop reason: SDUPTHER

## 2018-01-16 ASSESSMENT — PATIENT HEALTH QUESTIONNAIRE - PHQ9: CLINICAL INTERPRETATION OF PHQ2 SCORE: 0

## 2018-01-16 NOTE — ASSESSMENT & PLAN NOTE
Managed with singulair daily, as needed use of albuterol- typically with exercise  No shortness of breath or chronic cough

## 2018-01-16 NOTE — ASSESSMENT & PLAN NOTE
Doing well with OCP  Up to date on pap, followed by Dr. Mcdonnell  Light menses, no bleeding between periods

## 2018-01-16 NOTE — PROGRESS NOTES
Chief Complaint   Patient presents with   • Annual Exam     Anne John is a 22 y.o. female established patient here for annual exam, she has no acute concerns. 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 She lives with her roommate. She is exercising regularly-playing soccer and weight training at the gym. Diet is generally healthy and well balanced. We discussed:    S/P wrist surgery  Hardware removal recently with Dr. Ramos  Using wrist brace, healing well    Uses birth control  Doing well with OCP  Up to date on pap, followed by Dr. Mcdonnell  Light menses, no bleeding between periods    Mild intermittent asthma without complication  Managed with singulair daily, as needed use of albuterol- typically with exercise  No shortness of breath or chronic cough    Current medicines (including changes today)  Current Outpatient Prescriptions   Medication Sig Dispense Refill   • albuterol 108 (90 Base) MCG/ACT Aero Soln inhalation aerosol Inhale 2 Puffs by mouth every 6 hours as needed for Shortness of Breath. 8.5 g 6   • DESOGESTREL-ETHINYL ESTRADIOL PO Take  by mouth every day.     • Ascorbic Acid (VITAMIN C PO) Take  by mouth every day.     • Cholecalciferol (VITAMIN D PO) Take  by mouth every day.     • Cyanocobalamin (VITAMIN B12 PO) Take  by mouth every day.     • IRON PO Take  by mouth every day.     • montelukast (SINGULAIR) 10 MG Tab Take 1 Tab by mouth every day. 30 Tab 11   • ondansetron (ZOFRAN) 4 MG Tab tablet Take 1 Tab by mouth every 6 hours as needed for Nausea/Vomiting for up to 14 days. 20 Tab 0   • Prenatal Vit-Fe Fumarate-FA (PRENATAL VITAMIN PO) Take  by mouth every day.       No current facility-administered medications for this visit.      She  has a past medical history of Allergy; Anesthesia; ASTHMA; and Cold (09/2017).  She  has a past surgical history that includes radius ulna orif (6/25/2012); pelviscopy robotic (12/22/2015); ovarian cystectomy  "(12/2015); and hardware removal ortho (Right, 1/4/2018).  Social History   Substance Use Topics   • Smoking status: Never Smoker   • Smokeless tobacco: Never Used   • Alcohol use No     Social History     Social History Narrative   • No narrative on file     Family History   Problem Relation Age of Onset   • Anesthesia Mother      PONV   • Osteoporosis Mother    • Osteoporosis Father    • Hypertension Father    • Cancer     • Hypertension     • Heart Disease       Family Status   Relation Status   • Mother Alive   • Father Alive   • Sister Alive   • Brother Alive   • Sister Alive   •           ROS  Problems listed discussed above, all other systems reviewed and negative     Objective:     Blood pressure 102/60, pulse 84, temperature 36.7 °C (98 °F), height 1.753 m (5' 9\"), weight 63.3 kg (139 lb 9.6 oz), last menstrual period 12/17/2017, SpO2 99 %, not currently breastfeeding. Body mass index is 20.62 kg/m².  Physical Exam:  General: Alert, oriented in no acute distress.  Eye contact is good, speech is normal, affect calm  HEENT:  EOMI, perrl, Oral mucosa pink moist, no lesions. Nares patent. TMs gray with good landmarks bilaterally. No cervical or supraclavicular lymphadenopathy, no thyromegaly  Lungs: clear to auscultation bilaterally, good aeration, normal effort. No wheeze/ rhonchi/ rales.  CV: regular rate and rhythm, S1, S2. No murmur, no edema. Pedal pulses 2 + bilaterally  Abdomen: soft, flat, nontender, BS x4, no hepatosplenomegaly.  Ext: color normal, vascularity normal, temperature normal. No rash or lesions.  MS: No joint swelling or redness. Strength is 5/5 globally  Neuro: DTR 2+ bilaterally  Assessment and Plan:   The following treatment plan was discussed   1. Annual physical exam  Normal physical exam. General health and wellness discussion including healthy diet, regular exercise. 2000 iu Vit d3 advised daily. Preventative health screenings up-to-date. Advised regular dental cleanings, eye exam " yearly.     2. S/P wrist surgery  Recovering well, continues using a wrist brace. Followed by Dr. Ramos    3. Uses birth control  Doing well on current oral contraceptive, followed by OB/GYN    4. Mild intermittent asthma without complication  Stable with Singulair and as needed use of albuterol  albuterol 108 (90 Base) MCG/ACT Aero Soln inhalation aerosol       Educated in proper administration of medication(s) ordered today including safety, possible SE, risks, benefits, rationale and alternatives to therapy.     Followup: Annually, sooner as needed             Please note that this dictation was created using voice recognition software. I have worked with consultants from the vendor as well as technical experts from [facility] [employer] to optimize the interface. I have made every reasonable attempt to correct obvious errors, but I expect that there are errors of grammar and possibly content that I did not discover before finalizing the note.

## 2018-08-21 ENCOUNTER — HOSPITAL ENCOUNTER (OUTPATIENT)
Dept: LAB | Facility: MEDICAL CENTER | Age: 23
End: 2018-08-21
Attending: NURSE PRACTITIONER
Payer: COMMERCIAL

## 2018-08-21 PROCEDURE — 87591 N.GONORRHOEAE DNA AMP PROB: CPT

## 2018-08-21 PROCEDURE — 88175 CYTOPATH C/V AUTO FLUID REDO: CPT

## 2018-08-21 PROCEDURE — 87491 CHLMYD TRACH DNA AMP PROBE: CPT

## 2018-08-21 PROCEDURE — 87624 HPV HI-RISK TYP POOLED RSLT: CPT

## 2018-08-22 LAB
C TRACH DNA GENITAL QL NAA+PROBE: NEGATIVE
CYTOLOGY REG CYTOL: NORMAL
N GONORRHOEA DNA GENITAL QL NAA+PROBE: NEGATIVE
SPECIMEN SOURCE: NORMAL

## 2018-08-29 LAB
HPV HR 12 DNA CVX QL NAA+PROBE: POSITIVE
HPV16 DNA SPEC QL NAA+PROBE: NEGATIVE
HPV18 DNA SPEC QL NAA+PROBE: NEGATIVE
SPECIMEN SOURCE: ABNORMAL

## 2018-10-01 ENCOUNTER — IMMUNIZATION (OUTPATIENT)
Dept: OCCUPATIONAL MEDICINE | Facility: CLINIC | Age: 23
End: 2018-10-01

## 2018-10-01 DIAGNOSIS — Z23 NEED FOR VACCINATION: ICD-10-CM

## 2018-10-01 PROCEDURE — 90686 IIV4 VACC NO PRSV 0.5 ML IM: CPT | Performed by: PREVENTIVE MEDICINE

## 2018-12-03 ENCOUNTER — HOSPITAL ENCOUNTER (OUTPATIENT)
Dept: RADIOLOGY | Facility: MEDICAL CENTER | Age: 23
End: 2018-12-03
Attending: SPECIALIST
Payer: COMMERCIAL

## 2018-12-03 DIAGNOSIS — R10.2 PELVIC PAIN IN FEMALE: ICD-10-CM

## 2018-12-03 PROCEDURE — 76830 TRANSVAGINAL US NON-OB: CPT

## 2019-01-14 ENCOUNTER — DOCUMENTATION (OUTPATIENT)
Dept: OCCUPATIONAL MEDICINE | Facility: CLINIC | Age: 24
End: 2019-01-14

## 2019-01-14 NOTE — PROGRESS NOTES
Respiratory questionnaire reviewed and signed. See scanned documents.      OK for mask fit event.   Appointment scheduled for 1/16/19 @ 10:30am. E-mail notification sent.

## 2019-01-16 ENCOUNTER — EH NON-PROVIDER (OUTPATIENT)
Dept: OCCUPATIONAL MEDICINE | Facility: CLINIC | Age: 24
End: 2019-01-16

## 2019-01-16 DIAGNOSIS — Z02.89 ENCOUNTER FOR OCCUPATIONAL HEALTH EXAMINATION INVOLVING RESPIRATOR: Primary | ICD-10-CM

## 2019-01-16 PROCEDURE — 94375 RESPIRATORY FLOW VOLUME LOOP: CPT | Performed by: PREVENTIVE MEDICINE

## 2019-06-28 ENCOUNTER — PATIENT MESSAGE (OUTPATIENT)
Dept: MEDICAL GROUP | Facility: MEDICAL CENTER | Age: 24
End: 2019-06-28

## 2019-07-24 ENCOUNTER — HOSPITAL ENCOUNTER (OUTPATIENT)
Facility: MEDICAL CENTER | Age: 24
End: 2019-07-24
Attending: PHYSICIAN ASSISTANT
Payer: COMMERCIAL

## 2019-07-24 ENCOUNTER — OFFICE VISIT (OUTPATIENT)
Dept: URGENT CARE | Facility: CLINIC | Age: 24
End: 2019-07-24
Payer: COMMERCIAL

## 2019-07-24 VITALS
WEIGHT: 139 LBS | SYSTOLIC BLOOD PRESSURE: 92 MMHG | HEART RATE: 90 BPM | OXYGEN SATURATION: 99 % | BODY MASS INDEX: 20.59 KG/M2 | TEMPERATURE: 99.4 F | RESPIRATION RATE: 18 BRPM | DIASTOLIC BLOOD PRESSURE: 60 MMHG | HEIGHT: 69 IN

## 2019-07-24 DIAGNOSIS — J02.9 EXUDATIVE PHARYNGITIS: ICD-10-CM

## 2019-07-24 DIAGNOSIS — H66.002 ACUTE SUPPURATIVE OTITIS MEDIA OF LEFT EAR WITHOUT SPONTANEOUS RUPTURE OF TYMPANIC MEMBRANE, RECURRENCE NOT SPECIFIED: ICD-10-CM

## 2019-07-24 LAB
INT CON NEG: NORMAL
INT CON POS: NORMAL
S PYO AG THROAT QL: NEGATIVE

## 2019-07-24 PROCEDURE — 87880 STREP A ASSAY W/OPTIC: CPT | Performed by: PHYSICIAN ASSISTANT

## 2019-07-24 PROCEDURE — 99214 OFFICE O/P EST MOD 30 MIN: CPT | Performed by: PHYSICIAN ASSISTANT

## 2019-07-24 PROCEDURE — 87070 CULTURE OTHR SPECIMN AEROBIC: CPT

## 2019-07-24 RX ORDER — AMOXICILLIN 500 MG/1
500 CAPSULE ORAL 2 TIMES DAILY
Qty: 20 CAP | Refills: 0 | Status: SHIPPED | OUTPATIENT
Start: 2019-07-24 | End: 2019-08-03

## 2019-07-24 ASSESSMENT — ENCOUNTER SYMPTOMS
SHORTNESS OF BREATH: 0
SORE THROAT: 1
SPUTUM PRODUCTION: 0
COUGH: 0
FEVER: 0
SINUS PAIN: 0
CONSTITUTIONAL NEGATIVE: 1
CHILLS: 0

## 2019-07-24 NOTE — PROGRESS NOTES
Subjective:   Anne John is a 24 y.o. female who presents today with   Chief Complaint   Patient presents with   • Pharyngitis       Pharyngitis    This is a new problem. Episode onset: 3 days. The problem has been unchanged. Maximum temperature: low grade. The pain is moderate. Associated symptoms include congestion and ear pain. Pertinent negatives include no coughing or shortness of breath. She has tried cool liquids for the symptoms. The treatment provided mild relief.       PMH:  has a past medical history of Allergy; Anesthesia; ASTHMA; and Cold (09/2017).  MEDS:   Current Outpatient Prescriptions:   •  amoxicillin (AMOXIL) 500 MG Cap, Take 1 Cap by mouth 2 times a day for 10 days., Disp: 20 Cap, Rfl: 0  •  montelukast (SINGULAIR) 10 MG Tab, Take 1 Tab by mouth every day., Disp: 30 Tab, Rfl: 11  •  albuterol 108 (90 Base) MCG/ACT Aero Soln inhalation aerosol, Inhale 2 Puffs by mouth every 6 hours as needed for Shortness of Breath., Disp: 8.5 g, Rfl: 6  •  DESOGESTREL-ETHINYL ESTRADIOL PO, Take  by mouth every day., Disp: , Rfl:   •  Prenatal Vit-Fe Fumarate-FA (PRENATAL VITAMIN PO), Take  by mouth every day., Disp: , Rfl:   •  Ascorbic Acid (VITAMIN C PO), Take  by mouth every day., Disp: , Rfl:   •  Cholecalciferol (VITAMIN D PO), Take  by mouth every day., Disp: , Rfl:   •  Cyanocobalamin (VITAMIN B12 PO), Take  by mouth every day., Disp: , Rfl:   •  IRON PO, Take  by mouth every day., Disp: , Rfl:   ALLERGIES:   Allergies   Allergen Reactions   • Azithromycin Swelling     Uterine contractions; rare reaction to azith and allergy shots, per Dr. Tang, allergist  [id number]   • Latex Rash and Itching     .   • Other Environmental      Sagebrush, rabbit brush (environmental - seasonal allergies)     SURGHX:   Past Surgical History:   Procedure Laterality Date   • HARDWARE REMOVAL ORTHO Right 1/4/2018    Procedure: HARDWARE REMOVAL ORTHO - WRIST;  Surgeon: Reese Ramos M.D.;  Location: SURGERY  "[facility];  Service: Orthopedics   • PELVISCOPY ROBOTIC  12/22/2015    Procedure: PELVISCOPY ROBOTIC WITH OVARIAN CYSTECTOMY;  Surgeon: Pino Mcdonnell M.D.;  Location: SURGERY [facility];  Service:    • OVARIAN CYSTECTOMY  12/2015   • RADIUS ULNA ORIF  6/25/2012    Performed by KEITH SOUZA at SURGERY [facility]     SOCHX:  reports that she has never smoked. She has never used smokeless tobacco. She reports that she does not drink alcohol or use drugs.  FH: Reviewed with patient, not pertinent to this visit.       Review of Systems   Constitutional: Negative.  Negative for chills and fever.   HENT: Positive for congestion, ear pain and sore throat. Negative for sinus pain.    Respiratory: Negative for cough, sputum production and shortness of breath.    All other systems reviewed and are negative.       Objective:   BP (!) 92/60   Pulse 90   Temp 37.4 °C (99.4 °F) (Temporal)   Resp 18   Ht 1.753 m (5' 9\")   Wt 63 kg (139 lb)   SpO2 99%   BMI 20.53 kg/m²   Physical Exam   Constitutional: She appears well-developed and well-nourished. No distress.   HENT:   Head: Normocephalic and atraumatic.   Right Ear: Hearing, tympanic membrane and ear canal normal.   Left Ear: Hearing normal. Tympanic membrane is erythematous. A middle ear effusion is present.   Mouth/Throat: Posterior oropharyngeal edema and posterior oropharyngeal erythema present. Tonsillar exudate.   Eyes: Pupils are equal, round, and reactive to light.   Cardiovascular: Normal rate, regular rhythm and normal heart sounds.    Pulmonary/Chest: Effort normal and breath sounds normal.   Musculoskeletal:   Normal movement in all 4 extremities   Lymphadenopathy:        Head (right side): Tonsillar adenopathy present.        Head (left side): Tonsillar adenopathy present.     She has cervical adenopathy.   Neurological: She is alert. Coordination normal.   Skin: Skin is warm and dry.   Psychiatric: She has a normal mood and affect. "   Nursing note and vitals reviewed.    STREP A NEG    Assessment/Plan:   Assessment    1. Exudative pharyngitis  - POCT Rapid Strep A  - amoxicillin (AMOXIL) 500 MG Cap; Take 1 Cap by mouth 2 times a day for 10 days.  Dispense: 20 Cap; Refill: 0  - CULTURE THROAT; Future    2. Acute suppurative otitis media of left ear without spontaneous rupture of tympanic membrane, recurrence not specified  - amoxicillin (AMOXIL) 500 MG Cap; Take 1 Cap by mouth 2 times a day for 10 days.  Dispense: 20 Cap; Refill: 0  Throat culture pending. Will treat ear infection today with antibiotics.   Patient to use decongestant as well.  Differential diagnosis, natural history, supportive care, and indications for immediate follow-up discussed.   Patient given instructions and understanding of medications and treatment.    If not improving in 3-5 days, F/U with PCP or return to  if symptoms worsen.    Patient agreeable to plan.      Please note that this dictation was created using voice recognition software. I have made every reasonable attempt to correct obvious errors, but I expect that there are errors of grammar and possibly content that I did not discover before finalizing the note.    Golden Mcduffie PA-C

## 2019-07-26 LAB
BACTERIA SPEC RESP CULT: NORMAL
SIGNIFICANT IND 70042: NORMAL
SITE SITE: NORMAL
SOURCE SOURCE: NORMAL

## 2019-07-31 ENCOUNTER — OFFICE VISIT (OUTPATIENT)
Dept: MEDICAL GROUP | Facility: MEDICAL CENTER | Age: 24
End: 2019-07-31
Payer: COMMERCIAL

## 2019-07-31 VITALS
RESPIRATION RATE: 16 BRPM | HEART RATE: 80 BPM | WEIGHT: 132 LBS | SYSTOLIC BLOOD PRESSURE: 100 MMHG | TEMPERATURE: 97.9 F | DIASTOLIC BLOOD PRESSURE: 60 MMHG | OXYGEN SATURATION: 98 % | HEIGHT: 69 IN | BODY MASS INDEX: 19.55 KG/M2

## 2019-07-31 DIAGNOSIS — Z00.00 ANNUAL PHYSICAL EXAM: ICD-10-CM

## 2019-07-31 DIAGNOSIS — J45.20 MILD INTERMITTENT ASTHMA WITHOUT COMPLICATION: ICD-10-CM

## 2019-07-31 DIAGNOSIS — L70.0 ACNE VULGARIS: ICD-10-CM

## 2019-07-31 DIAGNOSIS — Z97.5 IUD (INTRAUTERINE DEVICE) IN PLACE: ICD-10-CM

## 2019-07-31 PROCEDURE — 99395 PREV VISIT EST AGE 18-39: CPT | Performed by: NURSE PRACTITIONER

## 2019-07-31 RX ORDER — CLINDAMYCIN AND BENZOYL PEROXIDE 10; 50 MG/G; MG/G
GEL TOPICAL
Qty: 50 G | Refills: 0 | Status: SHIPPED
Start: 2019-07-31 | End: 2020-04-21

## 2019-07-31 ASSESSMENT — PATIENT HEALTH QUESTIONNAIRE - PHQ9: CLINICAL INTERPRETATION OF PHQ2 SCORE: 0

## 2019-07-31 NOTE — ASSESSMENT & PLAN NOTE
Recent outbreak on her back following spray tan.  She does not typically have difficulty with acne

## 2019-07-31 NOTE — ASSESSMENT & PLAN NOTE
"Has been doing well until recent \"cold\" which developed after travel. Was seen in urgent care last week. Was prescribed amoxicillin for bilateral AOM. Cough persists but no sob, fever, chills  "

## 2019-07-31 NOTE — PROGRESS NOTES
"Chief Complaint   Patient presents with   • Annual Exam     Anne John is a 24 y.o. female here for annual exam.  She is working as an RN with the trauma team, plans to apply for CRNA school.  We discussed:    Mild intermittent asthma without complication  Has been doing well until recent \"cold\" which developed after travel. Was seen in urgent care last week. Was prescribed amoxicillin for bilateral AOM. Cough persists but no sob, fever, chills    IUD (intrauterine device) in place  Placed in Jan 2019. Not having menses at this time.     Acne vulgaris  Recent outbreak on her back following spray tan.  She does not typically have difficulty with acne    Current medicines (including changes today)  Current Outpatient Medications   Medication Sig Dispense Refill   • clindamycin-benzoyl peroxide (BENZACLIN) gel Apply thin layer to affected area twice daily as needed 50 g 0   • amoxicillin (AMOXIL) 500 MG Cap Take 1 Cap by mouth 2 times a day for 10 days. 20 Cap 0   • albuterol 108 (90 Base) MCG/ACT Aero Soln inhalation aerosol Inhale 2 Puffs by mouth every 6 hours as needed for Shortness of Breath. 8.5 g 6   • Prenatal Vit-Fe Fumarate-FA (PRENATAL VITAMIN PO) Take  by mouth every day.     • Ascorbic Acid (VITAMIN C PO) Take  by mouth every day.     • Cholecalciferol (VITAMIN D PO) Take  by mouth every day.     • Cyanocobalamin (VITAMIN B12 PO) Take  by mouth every day.     • IRON PO Take  by mouth every day.     • montelukast (SINGULAIR) 10 MG Tab Take 1 Tab by mouth every day. 30 Tab 11     No current facility-administered medications for this visit.      She  has a past medical history of Allergy, Anesthesia, ASTHMA, and Cold (09/2017).  She  has a past surgical history that includes radius ulna orif (6/25/2012); pelviscopy robotic (12/22/2015); ovarian cystectomy (12/2015); and hardware removal ortho (Right, 1/4/2018).  Social History     Tobacco Use   • Smoking status: Never Smoker   • Smokeless " "tobacco: Never Used   Substance Use Topics   • Alcohol use: No     Alcohol/week: 0.0 oz   • Drug use: No     Social History     Social History Narrative   • Not on file     Family History   Problem Relation Age of Onset   • Anesthesia Mother         PONV   • Osteoporosis Mother    • Osteoporosis Father    • Hypertension Father    • Cancer Other    • Hypertension Other    • Heart Disease Other      Family Status   Relation Name Status   • Mo  Alive   • Fa  Alive   • Sis  Alive   • Bro  Alive   • Sis  Alive   • OTHER  (Not Specified)         ROS  Problems listed discussed above, all other systems reviewed and negative     Objective:     /60 (BP Location: Left arm, Patient Position: Sitting, BP Cuff Size: Adult)   Pulse 80   Temp 36.6 °C (97.9 °F) (Temporal)   Resp 16   Ht 1.74 m (5' 8.5\")   Wt 59.9 kg (132 lb)   SpO2 98%  Body mass index is 19.78 kg/m².  Physical Exam:  General: Alert, oriented in no acute distress.  Eye contact is good, speech is normal, affect calm  HEENT: Oral mucosa pink moist, no lesions. Nares patent. TMs gray with good landmarks bilaterally. No cervical or supraclavicular lymphadenopathy  Lungs: clear to auscultation bilaterally, good aeration, normal effort. No wheeze/ rhonchi/ rales.  CV: regular rate and rhythm, S1, S2. No murmur, no JVD, no edema. Pedal pulses 2 + bilaterally  Abdomen: soft, nontender, BS x4, no hepatosplenomegaly.  Ext: color normal, vascularity normal, temperature normal. No rash or lesions.  Neuro: DTR 2+ bilaterally  Assessment and Plan:   The following treatment plan was discussed  1. Annual physical exam  Normal physical exam. General health and wellness discussion including healthy diet, regular exercise. 2000 iu Vit d3 advised daily. Preventative health screenings up-to-date. Advised regular dental cleanings, eye exam yearly.     2. Mild intermittent asthma without complication   current URI which is expected to improve over the next several days.  " Asthma has been well controlled   3. IUD (intrauterine device) in place   stable   4. Acne vulgaris  clindamycin-benzoyl peroxide (BENZACLIN) gel       Educated in proper administration of medication(s) ordered today including safety, possible SE, risks, benefits, rationale and alternatives to therapy.     Followup: annually             Please note that this dictation was created using voice recognition software. I have worked with consultants from the vendor as well as technical experts from [facility] [employer] to optimize the interface. I have made every reasonable attempt to correct obvious errors, but I expect that there are errors of grammar and possibly content that I did not discover before finalizing the note.

## 2019-08-08 ENCOUNTER — HOSPITAL ENCOUNTER (OUTPATIENT)
Dept: RADIOLOGY | Facility: MEDICAL CENTER | Age: 24
End: 2019-08-08
Attending: SPECIALIST
Payer: COMMERCIAL

## 2019-08-08 DIAGNOSIS — N83.201 BILATERAL OVARIAN CYSTS: ICD-10-CM

## 2019-08-08 DIAGNOSIS — N83.202 BILATERAL OVARIAN CYSTS: ICD-10-CM

## 2019-08-08 PROCEDURE — 76830 TRANSVAGINAL US NON-OB: CPT

## 2019-09-24 ENCOUNTER — IMMUNIZATION (OUTPATIENT)
Dept: OCCUPATIONAL MEDICINE | Facility: CLINIC | Age: 24
End: 2019-09-24

## 2019-09-24 DIAGNOSIS — Z23 NEED FOR VACCINATION: ICD-10-CM

## 2019-09-30 PROCEDURE — 90686 IIV4 VACC NO PRSV 0.5 ML IM: CPT | Performed by: PREVENTIVE MEDICINE

## 2019-11-22 ENCOUNTER — HOSPITAL ENCOUNTER (OUTPATIENT)
Dept: LAB | Facility: MEDICAL CENTER | Age: 24
End: 2019-11-22
Attending: SPECIALIST
Payer: COMMERCIAL

## 2019-11-22 PROCEDURE — 87591 N.GONORRHOEAE DNA AMP PROB: CPT

## 2019-11-22 PROCEDURE — 87491 CHLMYD TRACH DNA AMP PROBE: CPT

## 2020-01-28 ENCOUNTER — EH NON-PROVIDER (OUTPATIENT)
Dept: OCCUPATIONAL MEDICINE | Facility: CLINIC | Age: 25
End: 2020-01-28

## 2020-01-28 DIAGNOSIS — Z02.89 ENCOUNTER FOR OCCUPATIONAL HEALTH EXAMINATION INVOLVING RESPIRATOR: ICD-10-CM

## 2020-01-28 PROCEDURE — 94375 RESPIRATORY FLOW VOLUME LOOP: CPT | Performed by: NURSE PRACTITIONER

## 2020-01-28 PROCEDURE — 94010 BREATHING CAPACITY TEST: CPT | Performed by: NURSE PRACTITIONER

## 2020-01-29 ENCOUNTER — EH NON-PROVIDER (OUTPATIENT)
Dept: OCCUPATIONAL MEDICINE | Facility: CLINIC | Age: 25
End: 2020-01-29

## 2020-01-29 DIAGNOSIS — Z01.89 RESPIRATORY CLEARANCE EXAMINATION, ENCOUNTER FOR: ICD-10-CM

## 2020-01-30 NOTE — PROGRESS NOTES
Spirometry test are within normal limits for patient's stated age, height, ans weight at the time of test. Patient is physically able to perform essential functions based on job title or work conditions defined at the time of evaluation without accommodations as it pertains to their respiratory status.     See scanned documents.

## 2020-02-07 ENCOUNTER — OFFICE VISIT (OUTPATIENT)
Dept: MEDICAL GROUP | Facility: MEDICAL CENTER | Age: 25
End: 2020-02-07
Payer: COMMERCIAL

## 2020-02-07 ENCOUNTER — HOSPITAL ENCOUNTER (OUTPATIENT)
Dept: LAB | Facility: MEDICAL CENTER | Age: 25
End: 2020-02-07
Attending: NURSE PRACTITIONER
Payer: COMMERCIAL

## 2020-02-07 VITALS
BODY MASS INDEX: 21.62 KG/M2 | RESPIRATION RATE: 16 BRPM | HEART RATE: 102 BPM | TEMPERATURE: 97.9 F | HEIGHT: 69 IN | WEIGHT: 146 LBS | SYSTOLIC BLOOD PRESSURE: 98 MMHG | DIASTOLIC BLOOD PRESSURE: 70 MMHG | OXYGEN SATURATION: 97 %

## 2020-02-07 DIAGNOSIS — Z11.1 SCREENING-PULMONARY TB: ICD-10-CM

## 2020-02-07 DIAGNOSIS — Z00.00 ANNUAL PHYSICAL EXAM: ICD-10-CM

## 2020-02-07 PROCEDURE — 99395 PREV VISIT EST AGE 18-39: CPT | Performed by: NURSE PRACTITIONER

## 2020-02-07 PROCEDURE — 36415 COLL VENOUS BLD VENIPUNCTURE: CPT

## 2020-02-07 PROCEDURE — 86480 TB TEST CELL IMMUN MEASURE: CPT

## 2020-02-07 ASSESSMENT — PATIENT HEALTH QUESTIONNAIRE - PHQ9: CLINICAL INTERPRETATION OF PHQ2 SCORE: 0

## 2020-02-07 NOTE — PROGRESS NOTES
Chief Complaint   Patient presents with   • Other     physical      Anne John is a 24 y.o. female here for physical exam.  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  Up-to-date on Pap and vaccinations.    Current medicines (including changes today)  Current Outpatient Medications   Medication Sig Dispense Refill   • clindamycin-benzoyl peroxide (BENZACLIN) gel Apply thin layer to affected area twice daily as needed 50 g 0   • albuterol 108 (90 Base) MCG/ACT Aero Soln inhalation aerosol Inhale 2 Puffs by mouth every 6 hours as needed for Shortness of Breath. 8.5 g 6   • Prenatal Vit-Fe Fumarate-FA (PRENATAL VITAMIN PO) Take  by mouth every day.     • Ascorbic Acid (VITAMIN C PO) Take  by mouth every day.     • Cholecalciferol (VITAMIN D PO) Take  by mouth every day.     • Cyanocobalamin (VITAMIN B12 PO) Take  by mouth every day.     • IRON PO Take  by mouth every day.     • montelukast (SINGULAIR) 10 MG Tab Take 1 Tab by mouth every day. 30 Tab 11     No current facility-administered medications for this visit.      She  has a past medical history of Allergy, Anesthesia, ASTHMA, and Cold (09/2017).  She  has a past surgical history that includes radius ulna orif (6/25/2012); pelviscopy robotic (12/22/2015); ovarian cystectomy (12/2015); and hardware removal ortho (Right, 1/4/2018).  Social History     Tobacco Use   • Smoking status: Never Smoker   • Smokeless tobacco: Never Used   Substance Use Topics   • Alcohol use: No     Alcohol/week: 0.0 oz   • Drug use: No     Social History     Patient does not qualify to have social determinant information on file (likely too young).   Social History Narrative   • Not on file     Family History   Problem Relation Age of Onset   • Anesthesia Mother         PONV   • Osteoporosis Mother    • Osteoporosis Father    • Hypertension Father    • Cancer Other    • Hypertension Other    • Heart Disease Other      Family  "Status   Relation Name Status   • Mo  Alive   • Fa  Alive   • Sis  Alive   • Bro  Alive   • Sis  Alive   • OTHER  (Not Specified)         ROS  Problems listed discussed above, all other systems reviewed and negative     Objective:     BP (!) 98/70 (BP Location: Left arm, Patient Position: Sitting, BP Cuff Size: Adult)   Pulse (!) 102   Temp 36.6 °C (97.9 °F) (Temporal)   Resp 16   Ht 1.74 m (5' 8.5\")   Wt 66.2 kg (146 lb)   SpO2 97%  Body mass index is 21.88 kg/m².  Physical Exam:  General: Alert, oriented in no acute distress.  Eye contact is good, speech is normal, affect calm  HEENT: Oral mucosa pink moist, no lesions. Nares patent. TMs gray with good landmarks bilaterally. No cervical or supraclavicular lymphadenopathy  Lungs: clear to auscultation bilaterally, good aeration, normal effort. No wheeze/ rhonchi/ rales.  CV: regular rate and rhythm, S1, S2. No murmur, no JVD, no edema. Pedal pulses 2 + bilaterally  Abdomen: soft, nontender, BS x4, No CVAT, no hepatosplenomegaly.  Neuro: DTR 2+ bilaterally  Assessment and Plan:   The following treatment plan was discussed   1. Annual physical exam   normal physical exam.  General health and wellness discussion, up-to-date on vaccinations.  Paperwork completed for her CRNA program   2. Screening-pulmonary TB  Quantiferon Gold TB (PPD)         Followup: Annually, sooner as needed           Please note that this dictation was created using voice recognition software. I have worked with consultants from the vendor as well as technical experts from [employer] to optimize the interface. I have made every reasonable attempt to correct obvious errors, but I expect that there are errors of grammar and possibly content that I did not discover before finalizing the note.   "

## 2020-02-10 LAB
GAMMA INTERFERON BACKGROUND BLD IA-ACNC: 0.03 IU/ML
M TB IFN-G BLD-IMP: NEGATIVE
M TB IFN-G CD4+ BCKGRND COR BLD-ACNC: 0 IU/ML
MITOGEN IGNF BCKGRD COR BLD-ACNC: >10 IU/ML
QFT TB2 - NIL TBQ2: 0 IU/ML

## 2020-02-24 ENCOUNTER — OFFICE VISIT (OUTPATIENT)
Dept: URGENT CARE | Facility: CLINIC | Age: 25
End: 2020-02-24
Payer: COMMERCIAL

## 2020-02-24 VITALS
OXYGEN SATURATION: 97 % | WEIGHT: 145 LBS | TEMPERATURE: 102 F | HEIGHT: 69 IN | RESPIRATION RATE: 18 BRPM | DIASTOLIC BLOOD PRESSURE: 58 MMHG | SYSTOLIC BLOOD PRESSURE: 98 MMHG | HEART RATE: 107 BPM | BODY MASS INDEX: 21.48 KG/M2

## 2020-02-24 DIAGNOSIS — J11.1 INFLUENZA: ICD-10-CM

## 2020-02-24 DIAGNOSIS — R30.0 DYSURIA: ICD-10-CM

## 2020-02-24 LAB
APPEARANCE UR: NORMAL
BILIRUB UR STRIP-MCNC: NORMAL MG/DL
COLOR UR AUTO: NORMAL
GLUCOSE UR STRIP.AUTO-MCNC: NORMAL MG/DL
KETONES UR STRIP.AUTO-MCNC: NORMAL MG/DL
LEUKOCYTE ESTERASE UR QL STRIP.AUTO: NORMAL
NITRITE UR QL STRIP.AUTO: NORMAL
PH UR STRIP.AUTO: 5 [PH] (ref 5–8)
PROT UR QL STRIP: 300 MG/DL
RBC UR QL AUTO: NORMAL
SP GR UR STRIP.AUTO: 1
UROBILINOGEN UR STRIP-MCNC: NORMAL MG/DL

## 2020-02-24 PROCEDURE — 99214 OFFICE O/P EST MOD 30 MIN: CPT | Performed by: FAMILY MEDICINE

## 2020-02-24 PROCEDURE — 81002 URINALYSIS NONAUTO W/O SCOPE: CPT | Performed by: FAMILY MEDICINE

## 2020-02-24 RX ORDER — CEFDINIR 300 MG/1
300 CAPSULE ORAL 2 TIMES DAILY
Qty: 10 CAP | Refills: 0 | Status: SHIPPED | OUTPATIENT
Start: 2020-02-24 | End: 2020-02-29

## 2020-02-24 RX ORDER — OSELTAMIVIR PHOSPHATE 75 MG/1
75 CAPSULE ORAL 2 TIMES DAILY
Qty: 10 CAP | Refills: 0 | Status: SHIPPED
Start: 2020-02-24 | End: 2020-04-21

## 2020-02-24 ASSESSMENT — ENCOUNTER SYMPTOMS
FEVER: 1
COUGH: 1
FLANK PAIN: 0
VOMITING: 1

## 2020-02-24 NOTE — LETTER
February 24, 2020         Patient: Anne Jonh   YOB: 1995   Date of Visit: 2/24/2020           To Whom it May Concern:    Anne John was seen in my clinic on 2/24/2020. She may return to work on 2/27/2020..    If you have any questions or concerns, please don't hesitate to call.        Sincerely,           Meliton Duran M.D.  Electronically Signed

## 2020-02-25 NOTE — PROGRESS NOTES
"Subjective:   Anne John  is a 24 y.o. female who presents for Fever (102.2 this am, hypoxic, dizzy, body aches, chills, dehydrated for 1 day) and Dysuria (burning 3 days )        Fever    This is a new problem. The current episode started in the past 7 days. The problem occurs constantly. The problem has been rapidly worsening. Associated symptoms include congestion, coughing, muscle aches, urinary pain and vomiting.   Dysuria    This is a new problem. The current episode started in the past 7 days. The problem occurs every urination. The problem has been gradually worsening. The quality of the pain is described as burning. The pain is moderate. Associated symptoms include frequency and vomiting. Pertinent negatives include no discharge or flank pain.     Review of Systems   Constitutional: Positive for fever.   HENT: Positive for congestion.    Respiratory: Positive for cough.    Gastrointestinal: Positive for vomiting.   Genitourinary: Positive for dysuria and frequency. Negative for flank pain.     Allergies   Allergen Reactions   • Azithromycin Swelling     Uterine contractions; rare reaction to azith and allergy shots, per Dr. Tang, allergist  [id number]   • Latex Rash and Itching     .   • Other Environmental      Sagebrush, rabbit brush (environmental - seasonal allergies)      Objective:   BP (!) 98/58 (BP Location: Right arm, Patient Position: Sitting, BP Cuff Size: Adult)   Pulse (!) 107   Temp (!) 38.9 °C (102 °F) (Temporal)   Resp 18   Ht 1.753 m (5' 9\")   Wt 65.8 kg (145 lb)   SpO2 97%   BMI 21.41 kg/m²   Physical Exam  Constitutional:       General: She is not in acute distress.     Appearance: She is well-developed.   HENT:      Head: Normocephalic and atraumatic.   Eyes:      Conjunctiva/sclera: Conjunctivae normal.      Pupils: Pupils are equal, round, and reactive to light.   Cardiovascular:      Rate and Rhythm: Normal rate and regular rhythm.      Heart sounds: No murmur. "   Pulmonary:      Effort: Pulmonary effort is normal. No respiratory distress.      Breath sounds: Normal breath sounds.   Abdominal:      General: Bowel sounds are normal. There is no distension.      Palpations: Abdomen is soft.      Tenderness: There is abdominal tenderness in the suprapubic area.   Skin:     General: Skin is warm and dry.   Neurological:      Mental Status: She is alert and oriented to person, place, and time.      Sensory: No sensory deficit.      Deep Tendon Reflexes: Reflexes are normal and symmetric.           Assessment/Plan:   1. Influenza  - oseltamivir (TAMIFLU) 75 MG Cap; Take 1 Cap by mouth 2 times a day.  Dispense: 10 Cap; Refill: 0  - cefdinir (OMNICEF) 300 MG Cap; Take 1 Cap by mouth 2 times a day for 5 days.  Dispense: 10 Cap; Refill: 0  Differential diagnosis, natural history, supportive care, and indications for immediate follow-up discussed.

## 2020-02-25 NOTE — PATIENT INSTRUCTIONS

## 2020-03-12 ENCOUNTER — HOSPITAL ENCOUNTER (OUTPATIENT)
Dept: LAB | Facility: MEDICAL CENTER | Age: 25
End: 2020-03-12
Attending: SPECIALIST
Payer: COMMERCIAL

## 2020-03-12 PROCEDURE — 87591 N.GONORRHOEAE DNA AMP PROB: CPT

## 2020-03-12 PROCEDURE — 87491 CHLMYD TRACH DNA AMP PROBE: CPT

## 2020-03-12 PROCEDURE — 87529 HSV DNA AMP PROBE: CPT | Mod: 91

## 2020-03-14 LAB
HSV1 DNA SPEC QL NAA+PROBE: NEGATIVE
HSV2 DNA SPEC QL NAA+PROBE: POSITIVE
SPECIMEN SOURCE: ABNORMAL

## 2020-04-21 ENCOUNTER — TELEPHONE (OUTPATIENT)
Dept: MEDICAL GROUP | Facility: MEDICAL CENTER | Age: 25
End: 2020-04-21

## 2020-04-21 ENCOUNTER — HOSPITAL ENCOUNTER (EMERGENCY)
Facility: MEDICAL CENTER | Age: 25
End: 2020-04-21
Attending: EMERGENCY MEDICINE
Payer: COMMERCIAL

## 2020-04-21 ENCOUNTER — APPOINTMENT (OUTPATIENT)
Dept: RADIOLOGY | Facility: MEDICAL CENTER | Age: 25
End: 2020-04-21
Attending: EMERGENCY MEDICINE
Payer: COMMERCIAL

## 2020-04-21 VITALS
WEIGHT: 144.62 LBS | BODY MASS INDEX: 21.42 KG/M2 | DIASTOLIC BLOOD PRESSURE: 63 MMHG | HEIGHT: 69 IN | HEART RATE: 99 BPM | OXYGEN SATURATION: 97 % | RESPIRATION RATE: 17 BRPM | TEMPERATURE: 99.2 F | SYSTOLIC BLOOD PRESSURE: 116 MMHG

## 2020-04-21 DIAGNOSIS — N15.1 KIDNEY, PERINEPHRIC ABSCESS: ICD-10-CM

## 2020-04-21 DIAGNOSIS — N12 PYELONEPHRITIS OF RIGHT KIDNEY: ICD-10-CM

## 2020-04-21 DIAGNOSIS — R10.9 ABDOMINAL PAIN, UNSPECIFIED ABDOMINAL LOCATION: ICD-10-CM

## 2020-04-21 LAB
ALBUMIN SERPL BCP-MCNC: 4.5 G/DL (ref 3.2–4.9)
ALBUMIN/GLOB SERPL: 1.2 G/DL
ALP SERPL-CCNC: 71 U/L (ref 30–99)
ALT SERPL-CCNC: 21 U/L (ref 2–50)
ANION GAP SERPL CALC-SCNC: 16 MMOL/L (ref 7–16)
APPEARANCE UR: CLEAR
AST SERPL-CCNC: 20 U/L (ref 12–45)
BACTERIA #/AREA URNS HPF: ABNORMAL /HPF
BASOPHILS # BLD AUTO: 0.4 % (ref 0–1.8)
BASOPHILS # BLD: 0.03 K/UL (ref 0–0.12)
BILIRUB SERPL-MCNC: 0.4 MG/DL (ref 0.1–1.5)
BILIRUB UR QL STRIP.AUTO: NEGATIVE
BUN SERPL-MCNC: 18 MG/DL (ref 8–22)
CALCIUM SERPL-MCNC: 9.5 MG/DL (ref 8.5–10.5)
CHLORIDE SERPL-SCNC: 101 MMOL/L (ref 96–112)
CO2 SERPL-SCNC: 23 MMOL/L (ref 20–33)
COLOR UR: YELLOW
COVID ORDER STATUS COVID19: NORMAL
CREAT SERPL-MCNC: 0.89 MG/DL (ref 0.5–1.4)
EOSINOPHIL # BLD AUTO: 0.06 K/UL (ref 0–0.51)
EOSINOPHIL NFR BLD: 0.7 % (ref 0–6.9)
EPI CELLS #/AREA URNS HPF: ABNORMAL /HPF
ERYTHROCYTE [DISTWIDTH] IN BLOOD BY AUTOMATED COUNT: 45.1 FL (ref 35.9–50)
GLOBULIN SER CALC-MCNC: 3.9 G/DL (ref 1.9–3.5)
GLUCOSE SERPL-MCNC: 107 MG/DL (ref 65–99)
GLUCOSE UR STRIP.AUTO-MCNC: NEGATIVE MG/DL
HCG SERPL QL: NEGATIVE
HCT VFR BLD AUTO: 47.4 % (ref 37–47)
HGB BLD-MCNC: 15.8 G/DL (ref 12–16)
HYALINE CASTS #/AREA URNS LPF: ABNORMAL /LPF
IMM GRANULOCYTES # BLD AUTO: 0.02 K/UL (ref 0–0.11)
IMM GRANULOCYTES NFR BLD AUTO: 0.2 % (ref 0–0.9)
KETONES UR STRIP.AUTO-MCNC: NEGATIVE MG/DL
LEUKOCYTE ESTERASE UR QL STRIP.AUTO: ABNORMAL
LYMPHOCYTES # BLD AUTO: 2.15 K/UL (ref 1–4.8)
LYMPHOCYTES NFR BLD: 25.5 % (ref 22–41)
MCH RBC QN AUTO: 32.6 PG (ref 27–33)
MCHC RBC AUTO-ENTMCNC: 33.3 G/DL (ref 33.6–35)
MCV RBC AUTO: 97.7 FL (ref 81.4–97.8)
MICRO URNS: ABNORMAL
MONOCYTES # BLD AUTO: 1.24 K/UL (ref 0–0.85)
MONOCYTES NFR BLD AUTO: 14.7 % (ref 0–13.4)
NEUTROPHILS # BLD AUTO: 4.93 K/UL (ref 2–7.15)
NEUTROPHILS NFR BLD: 58.5 % (ref 44–72)
NITRITE UR QL STRIP.AUTO: POSITIVE
NRBC # BLD AUTO: 0 K/UL
NRBC BLD-RTO: 0 /100 WBC
PH UR STRIP.AUTO: 5 [PH] (ref 5–8)
PLATELET # BLD AUTO: 169 K/UL (ref 164–446)
PMV BLD AUTO: 9.1 FL (ref 9–12.9)
POTASSIUM SERPL-SCNC: 3.4 MMOL/L (ref 3.6–5.5)
PROT SERPL-MCNC: 8.4 G/DL (ref 6–8.2)
PROT UR QL STRIP: NEGATIVE MG/DL
RBC # BLD AUTO: 4.85 M/UL (ref 4.2–5.4)
RBC # URNS HPF: ABNORMAL /HPF
RBC UR QL AUTO: ABNORMAL
SODIUM SERPL-SCNC: 140 MMOL/L (ref 135–145)
SP GR UR STRIP.AUTO: 1.04
UROBILINOGEN UR STRIP.AUTO-MCNC: 0.2 MG/DL
WBC # BLD AUTO: 8.4 K/UL (ref 4.8–10.8)
WBC #/AREA URNS HPF: ABNORMAL /HPF

## 2020-04-21 PROCEDURE — 96365 THER/PROPH/DIAG IV INF INIT: CPT

## 2020-04-21 PROCEDURE — 74177 CT ABD & PELVIS W/CONTRAST: CPT

## 2020-04-21 PROCEDURE — 700111 HCHG RX REV CODE 636 W/ 250 OVERRIDE (IP): Performed by: EMERGENCY MEDICINE

## 2020-04-21 PROCEDURE — 84703 CHORIONIC GONADOTROPIN ASSAY: CPT

## 2020-04-21 PROCEDURE — 87186 SC STD MICRODIL/AGAR DIL: CPT

## 2020-04-21 PROCEDURE — 700117 HCHG RX CONTRAST REV CODE 255: Performed by: EMERGENCY MEDICINE

## 2020-04-21 PROCEDURE — 80053 COMPREHEN METABOLIC PANEL: CPT

## 2020-04-21 PROCEDURE — G2023 SPECIMEN COLLECT COVID-19: HCPCS | Performed by: EMERGENCY MEDICINE

## 2020-04-21 PROCEDURE — 87086 URINE CULTURE/COLONY COUNT: CPT

## 2020-04-21 PROCEDURE — 85025 COMPLETE CBC W/AUTO DIFF WBC: CPT

## 2020-04-21 PROCEDURE — 700105 HCHG RX REV CODE 258: Performed by: EMERGENCY MEDICINE

## 2020-04-21 PROCEDURE — 87077 CULTURE AEROBIC IDENTIFY: CPT

## 2020-04-21 PROCEDURE — 81001 URINALYSIS AUTO W/SCOPE: CPT

## 2020-04-21 PROCEDURE — 99285 EMERGENCY DEPT VISIT HI MDM: CPT

## 2020-04-21 RX ORDER — FLUCONAZOLE 200 MG/1
TABLET ORAL
Qty: 2 TAB | Refills: 0 | Status: SHIPPED | OUTPATIENT
Start: 2020-04-21

## 2020-04-21 RX ORDER — CEFDINIR 300 MG/1
300 CAPSULE ORAL 2 TIMES DAILY
Qty: 18 CAP | Refills: 0 | Status: SHIPPED | OUTPATIENT
Start: 2020-04-22 | End: 2020-05-01

## 2020-04-21 RX ADMIN — IOHEXOL 100 ML: 350 INJECTION, SOLUTION INTRAVENOUS at 18:51

## 2020-04-21 RX ADMIN — CEFTRIAXONE SODIUM 2 G: 2 INJECTION, POWDER, FOR SOLUTION INTRAMUSCULAR; INTRAVENOUS at 19:53

## 2020-04-21 ASSESSMENT — LIFESTYLE VARIABLES: DO YOU DRINK ALCOHOL: NO

## 2020-04-21 NOTE — TELEPHONE ENCOUNTER
She needs to be evaluated. Please screen for respiratory questions and schedule in clinic. Or advise urgent care.

## 2020-04-21 NOTE — TELEPHONE ENCOUNTER
VOICEMAIL  1. Caller Name: Anne Kristine Wilson                        Call Back Number: [phone] (home)      2. 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 She stated that she went to OBGYN today and her temperature was 102.6, she stated she called into work and she wanted us to call her and give her our option on what would be next for her to do? She maybe thinks she could have an abscess below her ribs maybe because of her pain?    Please let me know what you would like to do.  Thank you.     3. Patient approves office to leave a detailed voicemail/[employer] message: yes

## 2020-04-22 ENCOUNTER — TELEPHONE (OUTPATIENT)
Dept: MEDICAL GROUP | Facility: MEDICAL CENTER | Age: 25
End: 2020-04-22

## 2020-04-22 NOTE — TELEPHONE ENCOUNTER
Patient called yesterday with concerns of abdominal pain, I have received notification that she was in ER last night and diagnosed with pyelonephritis.  I called to check in with her, she is doing well this morning.  Her pain is mild and she is tolerating oral antibiotics without issue.  She will contact me for any further concerns.  Janie COOK

## 2020-04-22 NOTE — ED TRIAGE NOTES
Pt comes in reporting he was kneed on her side and developed RUQ pain for approx 1 week. Pt was seen by gynecologist and told she had a fever. Denies SOB/cough

## 2020-04-22 NOTE — DISCHARGE INSTRUCTIONS
Call tomorrow to schedule urology appointment.  He will need follow-up regarding resolution of infection and abscess of the right kidney.  Return for dizziness, persistent high fever, severe pain, uncontrollable vomiting or any concerns.

## 2020-04-22 NOTE — ED PROVIDER NOTES
"ED Provider Note    CHIEF COMPLAINT  Chief Complaint   Patient presents with   • RUQ Pain       HPI  Anne John is a 24 y.o. female who presents with right mid abdominal pain, onset 1 week ago.  She stated at work and patient inadvertently kneed her in the side, Marissa no discomfort.  She woke the next morning with a sharp stabbing pain right abdomen mid aspect, at times worse with movement.  She describes the pain is constant.  She was going to go see her gynecologist today, she states she has a history of ovarian cyst, but had a fever of 102 today therefore diverted to the emergency department by the gynecologist.  Patient denies vaginal discharge or vaginal bleeding.  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  Pain is nonradiating.  No dysuria.  She states she was treated for urinary infection in February.  There has been no cough, throat pain, neck stiffness or headache.  She states she has been told she cannot come back to work given the fever secondary to concern regarding coronavirus during this pandemic.  Patient requesting COVID testing.  Patient denies nausea or vomiting    REVIEW OF SYSTEMS  Constitutional: Fever  Respiratory: No shortness of breath.  No pleurisy  Cardiac: No chest pain or syncope  Gastrointestinal: Right-sided abdominal pain, no nausea or vomiting  Musculoskeletal: No flank pain, no neck pain  Neurologic: No headache  Genitourinary: No dysuria.  History of UTI       All other systems are negative.     PAST MEDICAL HISTORY  Past Medical History:   Diagnosis Date   • Allergy    • Anesthesia     \"takes longer to wake up\"   • ASTHMA     seasonal/exercise induced, uses inhaler   • Cold 09/2017    amox, for a cold       FAMILY HISTORY  Family History   Problem Relation Age of Onset   • Anesthesia Mother         PONV   • Osteoporosis Mother    • Osteoporosis Father    • Hypertension Father    • Cancer Other    • " Hypertension Other    • Heart Disease Other        SOCIAL HISTORY  Social History     Socioeconomic History   • Marital status: Single     Spouse name: Not on file   • Number of children: Not on file   • Years of education: Not on file   • Highest education level: Not on file   Occupational History   • Not on file   Social Needs   • Financial resource strain: Not on file   • Food insecurity     Worry: Not on file     Inability: Not on file   • Transportation needs     Medical: Not on file     Non-medical: Not on file   Tobacco Use   • Smoking status: Never Smoker   • Smokeless tobacco: Never Used   Substance and Sexual Activity   • Alcohol use: No     Alcohol/week: 0.0 oz   • Drug use: No   • Sexual activity: Yes     Partners: Male     Birth control/protection: Pill   Lifestyle   • Physical activity     Days per week: Not on file     Minutes per session: Not on file   • Stress: Not on file   Relationships   • Social connections     Talks on phone: Not on file     Gets together: Not on file     Attends Zoroastrian service: Not on file     Active member of club or organization: Not on file     Attends meetings of clubs or organizations: Not on file     Relationship status: Not on file   • Intimate partner violence     Fear of current or ex partner: Not on file     Emotionally abused: Not on file     Physically abused: Not on file     Forced sexual activity: Not on file   Other Topics Concern   • Not on file   Social History Narrative   • Not on file       SURGICAL HISTORY  Past Surgical History:   Procedure Laterality Date   • HARDWARE REMOVAL ORTHO Right 1/4/2018    Procedure: HARDWARE REMOVAL ORTHO - WRIST;  Surgeon: Reese Ramos M.D.;  Location: SURGERY [facility];  Service: Orthopedics   • PELVISCOPY ROBOTIC  12/22/2015    Procedure: PELVISCOPY ROBOTIC WITH OVARIAN CYSTECTOMY;  Surgeon: Pino Mcdonnell M.D.;  Location: SURGERY [facility];  Service:    • OVARIAN CYSTECTOMY  12/2015   • RADIUS ULNA  "ORIF  6/25/2012    Performed by KEITH SOUZA at SURGERY [facility] ORS       CURRENT MEDICATIONS  Home Medications     Reviewed by Milli Smith R.N. (Registered Nurse) on 04/21/20 at 1725  Med List Status: Partial   Medication Last Dose Status   albuterol 108 (90 Base) MCG/ACT Aero Soln inhalation aerosol  Active   Ascorbic Acid (VITAMIN C PO)  Active   Cholecalciferol (VITAMIN D PO)  Active   Cyanocobalamin (VITAMIN B12 PO)  Active   IRON PO  Active   montelukast (SINGULAIR) 10 MG Tab  Active   Prenatal Vit-Fe Fumarate-FA (PRENATAL VITAMIN PO)  Active                ALLERGIES  Allergies   Allergen Reactions   • Azithromycin Swelling     Uterine contractions; rare reaction to azith and allergy shots, per Dr. Tang, allergist  [id number]   • Latex Rash and Itching     .   • Other Environmental      Sagebrush, rabbit brush (environmental - seasonal allergies)       PHYSICAL EXAM  VITAL SIGNS: /70   Pulse (!) 108   Temp 37.3 °C (99.2 °F) (Temporal)   Resp 18   Ht 1.753 m (5' 9\")   Wt 65.6 kg (144 lb 10 oz)   SpO2 97%   BMI 21.36 kg/m²   Constitutional: Well-nourished, no distress  ENT: Nares clear, mucous membranes moist.  Eyes:  Conjunctiva normal, No discharge.    Lymphatic: No adenopathy.   Cardiovascular: Normal heart rate, Normal rhythm.   Pulmonary: No wheezing, no rales  Gastrointestinal: Abdomen is soft, tender right mid abdomen.  No palpable mass.  Negative Hobson sign.  Lower abdomen and left side of the abdomen are soft and nontender.  Skin: Warm, Dry, No jaundice.  No bruising, no erythematous jimmie, no abrasion  Musculoskeletal:  No CVA tenderness.   Neurologic:  Normal motor and sensory function, No focal deficits noted.   Psychiatric:Normal affect, Normal mood.    RADIOLOGY/PROCEDURES/Labs  Results for orders placed or performed during the hospital encounter of 04/21/20   CBC WITH DIFFERENTIAL   Result Value Ref Range    WBC 8.4 4.8 - 10.8 K/uL    RBC 4.85 4.20 - 5.40 M/uL    " Hemoglobin 15.8 12.0 - 16.0 g/dL    Hematocrit 47.4 (H) 37.0 - 47.0 %    MCV 97.7 81.4 - 97.8 fL    MCH 32.6 27.0 - 33.0 pg    MCHC 33.3 (L) 33.6 - 35.0 g/dL    RDW 45.1 35.9 - 50.0 fL    Platelet Count 169 164 - 446 K/uL    MPV 9.1 9.0 - 12.9 fL    Neutrophils-Polys 58.50 44.00 - 72.00 %    Lymphocytes 25.50 22.00 - 41.00 %    Monocytes 14.70 (H) 0.00 - 13.40 %    Eosinophils 0.70 0.00 - 6.90 %    Basophils 0.40 0.00 - 1.80 %    Immature Granulocytes 0.20 0.00 - 0.90 %    Nucleated RBC 0.00 /100 WBC    Neutrophils (Absolute) 4.93 2.00 - 7.15 K/uL    Lymphs (Absolute) 2.15 1.00 - 4.80 K/uL    Monos (Absolute) 1.24 (H) 0.00 - 0.85 K/uL    Eos (Absolute) 0.06 0.00 - 0.51 K/uL    Baso (Absolute) 0.03 0.00 - 0.12 K/uL    Immature Granulocytes (abs) 0.02 0.00 - 0.11 K/uL    NRBC (Absolute) 0.00 K/uL   COMP METABOLIC PANEL   Result Value Ref Range    Sodium 140 135 - 145 mmol/L    Potassium 3.4 (L) 3.6 - 5.5 mmol/L    Chloride 101 96 - 112 mmol/L    Co2 23 20 - 33 mmol/L    Anion Gap 16.0 7.0 - 16.0    Glucose 107 (H) 65 - 99 mg/dL    Bun 18 8 - 22 mg/dL    Creatinine 0.89 0.50 - 1.40 mg/dL    Calcium 9.5 8.5 - 10.5 mg/dL    AST(SGOT) 20 12 - 45 U/L    ALT(SGPT) 21 2 - 50 U/L    Alkaline Phosphatase 71 30 - 99 U/L    Total Bilirubin 0.4 0.1 - 1.5 mg/dL    Albumin 4.5 3.2 - 4.9 g/dL    Total Protein 8.4 (H) 6.0 - 8.2 g/dL    Globulin 3.9 (H) 1.9 - 3.5 g/dL    A-G Ratio 1.2 g/dL   HCG QUAL SERUM   Result Value Ref Range    Beta-Hcg Qualitative Serum Negative Negative   URINALYSIS CULTURE, IF INDICATED   Result Value Ref Range    Color Yellow     Character Clear     Specific Gravity 1.039 <1.035    Ph 5.0 5.0 - 8.0    Glucose Negative Negative mg/dL    Ketones Negative Negative mg/dL    Protein Negative Negative mg/dL    Bilirubin Negative Negative    Urobilinogen, Urine 0.2 Negative    Nitrite Positive (A) Negative    Leukocyte Esterase Small (A) Negative    Occult Blood Small (A) Negative    Micro Urine Req Microscopic     COVID/SARS CoV-2   Result Value Ref Range    COVID Order Status Received    ESTIMATED GFR   Result Value Ref Range    GFR If African American >60 >60 mL/min/1.73 m 2    GFR If Non African American >60 >60 mL/min/1.73 m 2   2019-nCoV RNA ([facility])   Result Value Ref Range    2019-nCoV Source NP Swab    URINE MICROSCOPIC (W/UA)   Result Value Ref Range    WBC 20-50 (A) /hpf    RBC 5-10 (A) /hpf    Bacteria Many (A) None /hpf    Epithelial Cells Few /hpf    Hyaline Cast 3-5 (A) /lpf     CT-ABDOMEN-PELVIS WITH   Final Result      1.  Findings suggest pyelonephritis involving RIGHT kidney with potential developing intrarenal abscess anteriorly measuring 1.5 cm.   2.  No secondary signs of acute appendicitis, however the appendix is not visualized.               COURSE & MEDICAL DECISION MAKING  Pertinent Labs & Imaging studies reviewed. (See chart for details)  COVID testing sent out.   urinalysis demonstrates evidence of infection.  The CT scan showed findings consistent with right pyelonephritis and potentially developing in for renal abscess.  This was discussed with on-call urology Dr. Salgado was recommended outpatient follow-up with urology and antibiotics.  Patient started on IV Rocephin and continues on Omnicef.  Patient stated she did not need medication for pain or nausea prescribed.  Etiology of her fever and abdominal pain likely secondary to the pyelonephritis with atypical presentation.  I suspect the need to her side agitated the area of infected kidney causing the pain as opposed to a traumatic injury.  Patient has agreed to return for worsening symptoms.    FINAL IMPRESSION  1. Pyelonephritis of right kidney     2. Kidney, perinephric abscess     3. Abdominal pain, unspecified abdominal location             Electronically signed by: Melvin Haji M.D., 4/21/2020 7:59 PM

## 2020-04-23 LAB
BACTERIA UR CULT: ABNORMAL
BACTERIA UR CULT: ABNORMAL
SARS-COV-2 RNA RESP QL NAA+PROBE: NOT DETECTED
SIGNIFICANT IND 70042: ABNORMAL
SITE SITE: ABNORMAL
SOURCE SOURCE: ABNORMAL
SPECIMEN SOURCE: NORMAL

## 2020-04-24 NOTE — ED NOTES
"ED Positive Culture Follow-up/Notification Note:    Date: 4/24/2020     Patient seen in the ED on 4/21/2020 for   1. Pyelonephritis of right kidney    2. Kidney, perinephric abscess    3. Abdominal pain, unspecified abdominal location     Given Ceftriaxone 2 g IV in the ER    Discharge Medication List as of 4/21/2020  8:23 PM      START taking these medications    Details   cefdinir (OMNICEF) 300 MG Cap Take 1 Cap by mouth 2 times a day for 9 days., Disp-18 Cap, R-0, Normal      fluconazole (DIFLUCAN) 200 MG Tab Take 1 tablet, repeat in 3 days if symptoms persist., Disp-2 Tab, R-0, Normal             Allergies: Azithromycin; Latex; and Other environmental     Vitals:    04/21/20 1721 04/21/20 1722 04/21/20 2031   BP: 127/70  116/63   Pulse: (!) 108  99   Resp: 18  17   Temp: 37.3 °C (99.2 °F)     TempSrc: Temporal     SpO2: 97%     Weight:  65.6 kg (144 lb 10 oz)    Height: 1.753 m (5' 9\")         Final cultures:   Results     Procedure Component Value Units Date/Time    URINE CULTURE(NEW) [[id number]]  (Abnormal)  (Susceptibility) Collected:  04/21/20 1900    Order Status:  Completed Specimen:  Urine Updated:  04/23/20 0841     Significant Indicator POS     Source UR     Site -     Culture Result -      Escherichia coli  >100,000 cfu/mL      Susceptibility     Escherichia coli (1)     Antibiotic Interpretation Microscan Method Status    Ampicillin Resistant >16 mcg/mL SU Final    Ceftriaxone Sensitive <=1 mcg/mL SU Final    Ceftazidime Sensitive <=1 mcg/mL SU Final    Cefotaxime Sensitive <=2 mcg/mL SU Final    Cefazolin Sensitive <=2 mcg/mL SU Final    Ciprofloxacin Sensitive <=1 mcg/mL SU Final    Ampicillin/sulbactam Sensitive <=8/4 mcg/mL SU Final    Cefepime Sensitive <=2 mcg/mL SU Final    Tobramycin Resistant >8 mcg/mL SU Final    Cefotetan Sensitive <=16 mcg/mL SU Final    Nitrofurantoin Sensitive <=32 mcg/mL SU Final    Gentamicin Resistant >8 mcg/mL SU Final    Levofloxacin Sensitive <=2 mcg/mL " SU Final    Pip/Tazobactam Sensitive <=16 mcg/mL SU Final    Trimeth/Sulfa Resistant >2/38 mcg/mL SU Final                   URINALYSIS CULTURE, IF INDICATED [[id number]]  (Abnormal) Collected:  04/21/20 1900    Order Status:  Completed Specimen:  Urine Updated:  04/21/20 1929     Color Yellow     Character Clear     Specific Gravity 1.039     Ph 5.0     Glucose Negative mg/dL      Ketones Negative mg/dL      Protein Negative mg/dL      Bilirubin Negative     Urobilinogen, Urine 0.2     Nitrite Positive     Leukocyte Esterase Small     Occult Blood Small     Micro Urine Req Microscopic    COVID/SARS CoV-2 [[id number]] Collected:  04/21/20 1831    Order Status:  Completed Specimen:  Respirate from Nasopharyngeal Updated:  04/21/20 1839     COVID Order Status Received         4/21/2020 18:31   2019-nCoV RNA Interp Not detected   2019-nCoV Source NP Swab       Plan:   Currently prescribed antimicrobial will be effective to treat the organism identified on culture. Patient has also tested negative for COVID-19. Will send the patient a message in My chart to inform of results, encourage compliance with antibiotics and inform her to stay at home until no fever for 3 days and symptoms are improving.      Dee Camacho, PharmD

## 2020-04-29 ENCOUNTER — HOSPITAL ENCOUNTER (OUTPATIENT)
Dept: RADIOLOGY | Facility: MEDICAL CENTER | Age: 25
End: 2020-04-29
Attending: SPECIALIST
Payer: COMMERCIAL

## 2020-04-29 DIAGNOSIS — N83.299 OVARIAN CYST, COMPLEX: ICD-10-CM

## 2020-04-29 PROCEDURE — 76830 TRANSVAGINAL US NON-OB: CPT

## 2020-08-13 ENCOUNTER — PATIENT MESSAGE (OUTPATIENT)
Dept: MEDICAL GROUP | Facility: MEDICAL CENTER | Age: 25
End: 2020-08-13

## 2021-09-09 ENCOUNTER — PATIENT MESSAGE (OUTPATIENT)
Dept: MEDICAL GROUP | Facility: MEDICAL CENTER | Age: 26
End: 2021-09-09

## 2021-09-09 DIAGNOSIS — D36.9 DERMOID CYST: ICD-10-CM

## 2021-09-09 DIAGNOSIS — R10.32 LLQ PAIN: ICD-10-CM

## 2021-10-20 ENCOUNTER — OFFICE VISIT (OUTPATIENT)
Dept: MEDICAL GROUP | Facility: MEDICAL CENTER | Age: 26
End: 2021-10-20
Payer: COMMERCIAL

## 2021-10-20 ENCOUNTER — HOSPITAL ENCOUNTER (OUTPATIENT)
Dept: RADIOLOGY | Facility: MEDICAL CENTER | Age: 26
End: 2021-10-20
Attending: NURSE PRACTITIONER
Payer: COMMERCIAL

## 2021-10-20 VITALS
RESPIRATION RATE: 18 BRPM | HEIGHT: 66 IN | DIASTOLIC BLOOD PRESSURE: 78 MMHG | HEART RATE: 65 BPM | TEMPERATURE: 97.7 F | WEIGHT: 147.49 LBS | OXYGEN SATURATION: 95 % | SYSTOLIC BLOOD PRESSURE: 102 MMHG | BODY MASS INDEX: 23.7 KG/M2

## 2021-10-20 DIAGNOSIS — J45.20 MILD INTERMITTENT ASTHMA WITHOUT COMPLICATION: ICD-10-CM

## 2021-10-20 DIAGNOSIS — Z00.00 ANNUAL PHYSICAL EXAM: ICD-10-CM

## 2021-10-20 DIAGNOSIS — N83.201 BILATERAL OVARIAN CYSTS: ICD-10-CM

## 2021-10-20 DIAGNOSIS — D36.9 DERMOID CYST: ICD-10-CM

## 2021-10-20 DIAGNOSIS — R10.32 LLQ PAIN: ICD-10-CM

## 2021-10-20 DIAGNOSIS — N83.202 BILATERAL OVARIAN CYSTS: ICD-10-CM

## 2021-10-20 PROCEDURE — 99395 PREV VISIT EST AGE 18-39: CPT | Performed by: NURSE PRACTITIONER

## 2021-10-20 PROCEDURE — 76830 TRANSVAGINAL US NON-OB: CPT

## 2021-10-20 RX ORDER — MONTELUKAST SODIUM 10 MG/1
10 TABLET ORAL DAILY
Qty: 90 TABLET | Refills: 3 | Status: SHIPPED | OUTPATIENT
Start: 2021-10-20

## 2021-10-20 RX ORDER — ALBUTEROL SULFATE 90 UG/1
2 AEROSOL, METERED RESPIRATORY (INHALATION) EVERY 6 HOURS PRN
Qty: 8.5 G | Refills: 6 | Status: SHIPPED | OUTPATIENT
Start: 2021-10-20

## 2021-10-20 RX ORDER — AMOXICILLIN 500 MG/1
500 CAPSULE ORAL 3 TIMES DAILY
Qty: 30 CAPSULE | Refills: 0 | Status: SHIPPED | OUTPATIENT
Start: 2021-10-20

## 2021-10-20 ASSESSMENT — FIBROSIS 4 INDEX: FIB4 SCORE: 0.65

## 2021-10-20 ASSESSMENT — PATIENT HEALTH QUESTIONNAIRE - PHQ9: CLINICAL INTERPRETATION OF PHQ2 SCORE: 0

## 2021-10-20 NOTE — PROGRESS NOTES
Subjective:     Chief Complaint   Patient presents with   • Annual Exam     Anne John is a 26 y.o. female here for annual.  She is followed by OB/GYN for Pap and just had this completed a month ago.    She has been recently living in [place] with imtiaz who has testicular cancer.  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  She is followed by Dr. Hemant Merritt    Mild intermittent asthma without complication  Managed with as needed use of albuterol and Singulair which generally is working well for her.  She did have a respiratory illness about a month ago and had to use her albuterol more frequently but this is now resolved       Current medicines (including changes today)  Current Outpatient Medications   Medication Sig Dispense Refill   • albuterol 108 (90 Base) MCG/ACT Aero Soln inhalation aerosol Inhale 2 Puffs every 6 hours as needed for Shortness of Breath. 8.5 g 6   • amoxicillin (AMOXIL) 500 MG Cap Take 1 Capsule by mouth 3 times a day. 30 Capsule 0   • montelukast (SINGULAIR) 10 MG Tab Take 1 Tablet by mouth every day. 90 Tablet 3   • Prenatal Vit-Fe Fumarate-FA (PRENATAL VITAMIN PO) Take  by mouth every day.     • Ascorbic Acid (VITAMIN C PO) Take  by mouth every day.     • Cholecalciferol (VITAMIN D PO) Take  by mouth every day.     • Cyanocobalamin (VITAMIN B12 PO) Take  by mouth every day.     • IRON PO Take  by mouth every day.     • fluconazole (DIFLUCAN) 200 MG Tab Take 1 tablet, repeat in 3 days if symptoms persist. (Patient not taking: Reported on 10/20/2021) 2 Tab 0     No current facility-administered medications for this visit.     She  has a past medical history of Allergy, Anesthesia, ASTHMA, and Cold (09/2017).    ROS included above     Objective:     /78 (BP Location: Right arm, Patient Position: Sitting, BP Cuff  "Size: Adult)   Pulse 65   Temp 36.5 °C (97.7 °F) (Temporal)   Resp 18   Ht 1.676 m (5' 6\")   Wt 66.9 kg (147 lb 7.8 oz)   SpO2 95%  Body mass index is 23.81 kg/m².     Physical Exam:  General: Alert, oriented in no acute distress.  Eye contact is good, speech is normal, affect calm  HEENT: TMs gray with good landmarks bilaterally. No lymphadenopathy.  Lungs: clear to auscultation bilaterally, normal effort, no wheeze/ rhonchi/ rales.  CV: regular rate and rhythm, S1, S2, no murmur  Abdomen: soft, nontender, no hepatosplenomegaly  Ext: no edema, color normal, vascularity normal, temperature normal    Assessment and Plan:   The following treatment plan was discussed   1. Annual physical exam   normal physical exam.  General health and wellness discussion including healthy diet, regular exercise, routine vision and dental care.  She is up-to-date on Pap smear, followed by OB/GYN   2. Mild intermittent asthma without complication   stable on current medication regimen  albuterol 108 (90 Base) MCG/ACT Aero Soln inhalation aerosol    montelukast (SINGULAIR) 10 MG Tab   3. Bilateral ovarian cysts   recent ultrasound showing no interval growth of the left ovarian dermoid cyst.  She is being monitored through her OB/GYN       Followup: annually, sooner as needed       Please note that this dictation was created using voice recognition software. I have worked with consultants from the vendor as well as technical experts from [facility] to optimize the interface. I have made every reasonable attempt to correct obvious errors, but I expect that there are errors of grammar and possibly content that I did not discover before finalizing the note.       "

## 2021-10-20 NOTE — ASSESSMENT & PLAN NOTE
Recent ultrasound reviewed, no substantial change in left ovarian cyst over the last few years.  She is followed by Dr. Hemant Merritt

## 2021-10-20 NOTE — ASSESSMENT & PLAN NOTE
Managed with as needed use of albuterol and Singulair which generally is working well for her.  She did have a respiratory illness about a month ago and had to use her albuterol more frequently but this is now resolved

## 2022-10-06 ENCOUNTER — TELEPHONE (OUTPATIENT)
Dept: MEDICAL GROUP | Facility: MEDICAL CENTER | Age: 27
End: 2022-10-06

## 2024-02-08 ENCOUNTER — APPOINTMENT (RX ONLY)
Dept: URBAN - METROPOLITAN AREA CLINIC 6 | Facility: CLINIC | Age: 29
Setting detail: DERMATOLOGY
End: 2024-02-08

## 2024-02-08 DIAGNOSIS — Z71.89 OTHER SPECIFIED COUNSELING: ICD-10-CM

## 2024-02-08 DIAGNOSIS — D22 MELANOCYTIC NEVI: ICD-10-CM

## 2024-02-08 DIAGNOSIS — D18.0 HEMANGIOMA: ICD-10-CM

## 2024-02-08 DIAGNOSIS — L81.4 OTHER MELANIN HYPERPIGMENTATION: ICD-10-CM

## 2024-02-08 PROBLEM — D18.01 HEMANGIOMA OF SKIN AND SUBCUTANEOUS TISSUE: Status: ACTIVE | Noted: 2024-02-08

## 2024-02-08 PROBLEM — D22.9 MELANOCYTIC NEVI, UNSPECIFIED: Status: ACTIVE | Noted: 2024-02-08

## 2024-02-08 PROBLEM — D22.5 MELANOCYTIC NEVI OF TRUNK: Status: ACTIVE | Noted: 2024-02-08

## 2024-02-08 PROCEDURE — 99203 OFFICE O/P NEW LOW 30 MIN: CPT

## 2024-02-08 PROCEDURE — ? SUNSCREEN RECOMMENDATIONS

## 2024-02-08 PROCEDURE — ? SUNSCREEN TREATMENT REGIMEN

## 2024-02-08 PROCEDURE — ? COUNSELING

## 2024-02-08 PROCEDURE — ? PHOTO-DOCUMENTATION

## 2024-02-08 ASSESSMENT — LOCATION SIMPLE DESCRIPTION DERM
LOCATION SIMPLE: RIGHT ANTERIOR NECK
LOCATION SIMPLE: LEFT HAND
LOCATION SIMPLE: CHEST
LOCATION SIMPLE: LEFT CHEEK
LOCATION SIMPLE: ABDOMEN
LOCATION SIMPLE: RIGHT HAND

## 2024-02-08 ASSESSMENT — LOCATION DETAILED DESCRIPTION DERM
LOCATION DETAILED: LEFT INFERIOR MEDIAL MALAR CHEEK
LOCATION DETAILED: EPIGASTRIC SKIN
LOCATION DETAILED: PERIUMBILICAL SKIN
LOCATION DETAILED: MIDDLE STERNUM
LOCATION DETAILED: RIGHT CLAVICULAR NECK
LOCATION DETAILED: SUBXIPHOID
LOCATION DETAILED: LEFT ULNAR DORSAL HAND
LOCATION DETAILED: RIGHT MEDIAL SUPERIOR CHEST
LOCATION DETAILED: RIGHT RADIAL DORSAL HAND

## 2024-02-08 ASSESSMENT — LOCATION ZONE DERM
LOCATION ZONE: TRUNK
LOCATION ZONE: FACE
LOCATION ZONE: NECK
LOCATION ZONE: HAND

## 2024-11-25 ENCOUNTER — OFFICE VISIT (OUTPATIENT)
Dept: URGENT CARE | Age: 29
End: 2024-11-25
Payer: COMMERCIAL

## 2024-11-25 VITALS
RESPIRATION RATE: 20 BRPM | WEIGHT: 105 LBS | BODY MASS INDEX: 17.93 KG/M2 | HEART RATE: 75 BPM | SYSTOLIC BLOOD PRESSURE: 106 MMHG | OXYGEN SATURATION: 99 % | HEIGHT: 64 IN | DIASTOLIC BLOOD PRESSURE: 64 MMHG | TEMPERATURE: 98.3 F

## 2024-11-25 DIAGNOSIS — R94.31 ACUTE ELECTROCARDIOGRAM CHANGES: ICD-10-CM

## 2024-11-25 DIAGNOSIS — R55 SYNCOPE, UNSPECIFIED SYNCOPE TYPE: ICD-10-CM

## 2024-11-25 DIAGNOSIS — N30.01 ACUTE CYSTITIS WITH HEMATURIA: Primary | ICD-10-CM

## 2024-11-25 LAB
POC APPEARANCE, URINE: ABNORMAL
POC BILIRUBIN, URINE: NEGATIVE
POC BLOOD, URINE: ABNORMAL
POC COLOR, URINE: ABNORMAL
POC GLUCOSE, URINE: NEGATIVE MG/DL
POC KETONES, URINE: ABNORMAL MG/DL
POC LEUKOCYTES, URINE: ABNORMAL
POC NITRITE,URINE: POSITIVE
POC PH, URINE: 7 PH
POC PROTEIN, URINE: ABNORMAL MG/DL
POC SPECIFIC GRAVITY, URINE: 1.02
POC UROBILINOGEN, URINE: 0.2 EU/DL
PREGNANCY TEST URINE, POC: NEGATIVE

## 2024-11-25 PROCEDURE — 87186 SC STD MICRODIL/AGAR DIL: CPT

## 2024-11-25 PROCEDURE — 87086 URINE CULTURE/COLONY COUNT: CPT

## 2024-11-25 RX ORDER — SULFAMETHOXAZOLE AND TRIMETHOPRIM 800; 160 MG/1; MG/1
1 TABLET ORAL 2 TIMES DAILY
Qty: 6 TABLET | Refills: 0 | Status: SHIPPED | OUTPATIENT
Start: 2024-11-25 | End: 2024-11-28

## 2024-11-25 ASSESSMENT — ENCOUNTER SYMPTOMS
DYSURIA: 1
ABDOMINAL PAIN: 1
PALPITATIONS: 0
HEADACHES: 0
FEVER: 0
BACK PAIN: 0
SHORTNESS OF BREATH: 0
FREQUENCY: 1
COUGH: 0
CHILLS: 0
HEMATURIA: 1
NAUSEA: 0
VOMITING: 0

## 2024-11-25 ASSESSMENT — PAIN SCALES - GENERAL: PAINLEVEL_OUTOF10: 2

## 2024-11-25 NOTE — PROGRESS NOTES
Subjective   Patient ID: Gerda Reveles is a 29 y.o. female. They present today with a chief complaint of UTI CONCERN (Pt states that around 3pm she noticed blood in her urine and also she stated that she passed out).    History of Present Illness  -c/o blood in her urine which started today  -also has some supra-pubic tenderness  -she reports she passed out while she was standing at the sink washing her hands   -LOC only a few seconds  -denies fever, chills, nausea, vomiting, CP, SOB, focal weakness, dizziness/lightheadedness, headache  -she denies back pain or neck pain  -denies history of UTI in the past            Past Medical History  Allergies as of 11/25/2024    (No Known Allergies)       (Not in a hospital admission)       Past Medical History:   Diagnosis Date    Acute maxillary sinusitis, unspecified 12/20/2020    Acute non-recurrent maxillary sinusitis    Encounter for screening for diseases of the blood and blood-forming organs and certain disorders involving the immune mechanism 12/08/2021    Screening for deficiency anemia    Other conditions influencing health status     Menstruation    Other specified health status     No pertinent past medical history    Personal history of other specified conditions 03/31/2021    History of nasal congestion       No past surgical history on file.     reports that she has never smoked. She has never used smokeless tobacco.    Review of Systems  Review of Systems   Constitutional:  Negative for chills and fever.   Respiratory:  Negative for cough and shortness of breath.    Cardiovascular:  Negative for chest pain, palpitations and leg swelling.   Gastrointestinal:  Positive for abdominal pain. Negative for nausea and vomiting.   Genitourinary:  Positive for dysuria, frequency and hematuria.   Musculoskeletal:  Negative for back pain.   Neurological:  Positive for syncope. Negative for headaches.   All other systems reviewed and are negative.      Objective   "  Vitals:    11/25/24 1714   BP: 106/64   BP Location: Right arm   Patient Position: Sitting   BP Cuff Size: Adult   Pulse: 75   Resp: 20   Temp: 36.8 °C (98.3 °F)   TempSrc: Oral   SpO2: 99%   Weight: 47.6 kg (105 lb)   Height: 1.626 m (5' 4\")     Patient's last menstrual period was 11/22/2024 (approximate).    Physical Exam  Vitals reviewed.   Constitutional:       General: She is not in acute distress.     Appearance: Normal appearance. She is not ill-appearing, toxic-appearing or diaphoretic.   HENT:      Head: Normocephalic and atraumatic.   Cardiovascular:      Rate and Rhythm: Normal rate and regular rhythm.      Pulses: Normal pulses.      Heart sounds: Normal heart sounds.   Pulmonary:      Effort: Pulmonary effort is normal. No respiratory distress.      Breath sounds: Normal breath sounds. No wheezing, rhonchi or rales.   Abdominal:      General: Abdomen is flat. Bowel sounds are normal. There is no distension.      Palpations: Abdomen is soft.      Tenderness: There is abdominal tenderness in the suprapubic area. There is no right CVA tenderness, left CVA tenderness, guarding or rebound.   Musculoskeletal:      Cervical back: Normal range of motion and neck supple. No rigidity or tenderness.   Neurological:      General: No focal deficit present.      Mental Status: She is alert and oriented to person, place, and time.      Cranial Nerves: Cranial nerves 2-12 are intact.      Motor: Motor function is intact.      Coordination: Coordination is intact.      Gait: Gait is intact.       /64 (BP Location: Right arm, Patient Position: Sitting, BP Cuff Size: Adult)   Pulse 75   Temp 36.8 °C (98.3 °F) (Oral)   Resp 20   Ht 1.626 m (5' 4\")   Wt 47.6 kg (105 lb)   LMP 11/22/2024 (Approximate)   SpO2 99%   BMI 18.02 kg/m²       Procedures    Point of Care Test & Imaging Results from this visit  Results for orders placed or performed in visit on 11/25/24   POCT UA Automated manually resulted   Result " Value Ref Range    POC Color, Urine Red (A) Straw, Yellow, Light-Yellow    POC Appearance, Urine Cloudy (A) Clear    POC Glucose, Urine NEGATIVE NEGATIVE mg/dl    POC Bilirubin, Urine NEGATIVE NEGATIVE    POC Ketones, Urine 40 (2+) (A) NEGATIVE mg/dl    POC Specific Gravity, Urine 1.020 1.005 - 1.035    POC Blood, Urine LARGE (3+) (A) NEGATIVE    POC PH, Urine 7.0 No Reference Range Established PH    POC Protein, Urine 100 (2+) (A) NEGATIVE, 30 (1+) mg/dl    POC Urobilinogen, Urine 0.2 0.2, 1.0 EU/DL    Poc Nitrite, Urine POSITIVE (A) NEGATIVE    POC Leukocytes, Urine LARGE (3+) (A) NEGATIVE   POCT pregnancy, urine manually resulted   Result Value Ref Range    Preg Test, Ur Negative Negative      ECG 12 lead    Result Date: 11/25/2024  -sinus rhythm 84 bpm -high-lateral infarct per EKG reading -moderate high-lateral repolarization disturbance secondary to ischemia -      Diagnostic study results (if any) were reviewed by Lilian Balderrama PA-C.    Assessment/Plan   Allergies, medications, history, and pertinent labs/EKGs/Imaging reviewed by Lilian Balderrama PA-C.     Medical Decision Making  Patient presented with hematuria and concern for UTI.  She reported syncopal event this evening prior to coming to the urgent care.  Denies prodromal symptoms.  Orthostatic vitals are negative. Suspect vasovagal syncope.  UA positive for leukest, nitrites, blood, and protein consistent with UTI.   Will start on bactrim and send urine culture.  Was initially going to discharge patient home with strict ER precautions, but EKG showed sinus rhythm, and findings which may suggest lateral infarct.  Has had EKG in the past, but unable to see EKG to see if similar changes in the past.  EKG reviewed with supervising attending, and recommended transfer to the ER for evaluation.   She does not have any chest pain at this time, but given the EKG findings, patient advised recommended patient go to the ER for evaluation to rule out  acute myocardial infarction or other acute cardiac pathology.  She is currently [facility] and will have her significant other take her to [facility] now.     Orders and Diagnoses  Diagnoses and all orders for this visit:  Acute cystitis with hematuria  -     POCT UA Automated manually resulted  -     POCT pregnancy, urine manually resulted  -     Urine Culture  -     sulfamethoxazole-trimethoprim (Bactrim DS) 800-160 mg tablet; Take 1 tablet by mouth 2 times a day for 3 days.  Syncope, unspecified syncope type  -     ECG 12 lead  -     Check orthostatic blood pressure  Acute electrocardiogram changes      Medical Admin Record      Patient disposition: ED    Electronically signed by Lilian Balderrama PA-C  7:20 PM

## 2024-11-25 NOTE — PATIENT INSTRUCTIONS
There were EKG changes which might represent heart attack.  Please go to the nearest ER for evaluation.      You were diagnosed with urinary tract infection.   -You have been prescribed an antibiotic.  Please finish course of antibiotics, unless otherwise told by a provider.  -We will send your urine for culture. We will call you if your antibiotic doesn't treat the bacteria that grew in the culture.   -Take antibiotic with food, yogurt, or a probiotic. I recommend taking a probiotic while taking this medication, and for 7 days after you finish it.  A probiotic is a supplement you can buy over-the-counter that helps support the good bacteria in your body while taking antibiotics. Probiotics help to avoid the side effects of antibiotics, such as diarrhea or yeast infections. It is best to take a probiotic about 2 hours after your dose of antibiotic.   -If you do not feel relief in 24-36 hours, please return or call the clinic so we can change your antibiotic if necessary  -If your symptoms worsen, go to the emergency room for evaluation  -Phenazopyridine (available over the counter as AZO Standard or as a prescription, Pyridium) is for frequency, urgency and bladder spasm relief. It contains orange dye and will stain clothing so wear old underwear while taking. It also can cause nausea if not taken with food.    - Drink a lot of fluid, 3 to 4 quarts a day. Cranberry juice is especially recommended, in addition to large amounts of water.  - Avoid caffeine, tea and carbonated beverages (Coke®, 7-Up®, etc). They tend to irritate the bladder.  - Alcohol may irritate the prostate.  - Aspirin, ibuprofen (Advil® or Motrin®) or acetaminophen (Tylenol®) may be used for temperature and/or discomfort.  -Follow up with PCP within 1 week if symptoms worsen    TO PREVENT FURTHER INFECTIONS:  -Empty the bladder often. Avoid holding urine for long periods of time.  -After a bowel movement, women should cleanse from front to back.  Use each tissue only once.  -Empty the bladder before and after sexual intercourse.  -If you develop back pain, fever, nausea (feeling sick to your stomach), vomiting, or your symptoms (problems) are no better in 3 days, return to clinic. Return sooner if you are getting worse.  -You will be notified if your urine culture is positive.     SEEK FURTHER TREATMENT IF YOU:  -Pass out   -Have dizziness/ligtheadedness  -Develop severe back pain or lower abdominal pain.  -Unable to urinate.  -Develop chills and fever.  -Develop nausea or vomiting.  -Have continued burning or discomfort with urination.    You should follow up with your PCP or GYN if you have persistent or recurring symptoms.

## 2024-11-28 LAB
BACTERIA UR CULT: ABNORMAL
BACTERIA UR CULT: ABNORMAL

## (undated) DEVICE — GLOVE SZ 7 BIOGEL PI MICRO - PF LF (50PR/BX 4BX/CA)

## (undated) DEVICE — PACK LOWER EXTREMITY - (2/CA)

## (undated) DEVICE — HEAD HOLDER JUNIOR/ADULT

## (undated) DEVICE — WATER IRRIGATION STERILE 1000ML (12EA/CA)

## (undated) DEVICE — NEPTUNE 4 PORT MANIFOLD - (20/PK)

## (undated) DEVICE — GLOVE BIOGEL PI ORTHO SZ 8 PF LF (40PR/BX)

## (undated) DEVICE — SUTURE 2-0 VICRYL PLUS SH - 27 INCH (36/BX)

## (undated) DEVICE — LACTATED RINGERS INJ 1000 ML - (14EA/CA 60CA/PF)

## (undated) DEVICE — ELECTRODE DUAL RETURN W/ CORD - (50/PK)

## (undated) DEVICE — DRAPE FLUOROSCAN C-ARM - 54 X 78 (40EA/CA)

## (undated) DEVICE — PLASTER ROLL 3X3YD XTRA FAST - 2-4 MIN (12EA/BX 6BX/CA)"

## (undated) DEVICE — ELECTRODE 850 FOAM ADHESIVE - HYDROGEL RADIOTRNSPRNT (50/PK)

## (undated) DEVICE — GLOVE, LITE (PAIR)

## (undated) DEVICE — MASK, LARYNGEAL AIRWAY #4

## (undated) DEVICE — TUBE CONNECTING SUCTION - CLEAR PLASTIC STERILE 72 IN (50EA/CA)

## (undated) DEVICE — MASK ANESTHESIA ADULT  - (100/CA)

## (undated) DEVICE — SUCTION INSTRUMENT YANKAUER BULBOUS TIP W/O VENT (50EA/CA)

## (undated) DEVICE — MASK AIRWAY SIZE 4 UNIQUE SILICON (10EA/BX)

## (undated) DEVICE — PADDING CAST 4 IN STERILE - 4 X 4 YDS (24/CA)

## (undated) DEVICE — SUTURE GENERAL

## (undated) DEVICE — TOURNIQUET CUFF 18 X 3 ONE PORT DISP - STERILE (10/BX)

## (undated) DEVICE — KIT ANESTHESIA W/CIRCUIT & 3/LT BAG W/FILTER (20EA/CA)

## (undated) DEVICE — HUMID-VENT HEAT AND MOISTURE EXCHANGE- (50/BX)

## (undated) DEVICE — SENSOR SPO2 NEO LNCS ADHESIVE (20/BX) SEE USER NOTES

## (undated) DEVICE — SODIUM CHL IRRIGATION 0.9% 1000ML (12EA/CA)

## (undated) DEVICE — GLOVE BIOGEL SZ 8 SURGICAL PF LTX - (50PR/BX 4BX/CA)

## (undated) DEVICE — KIT ROOM DECONTAMINATION

## (undated) DEVICE — PROTECTOR ULNA NERVE - (36PR/CA)

## (undated) DEVICE — SUTURE 4-0 ETHILON PS-2 18 (12PK/BX)"

## (undated) DEVICE — GOWN WARMING STANDARD FLEX - (30/CA)

## (undated) DEVICE — CANISTER SUCTION RIGID RED 1500CC (40EA/CA)